# Patient Record
Sex: MALE | Race: WHITE | NOT HISPANIC OR LATINO | Employment: OTHER | URBAN - METROPOLITAN AREA
[De-identification: names, ages, dates, MRNs, and addresses within clinical notes are randomized per-mention and may not be internally consistent; named-entity substitution may affect disease eponyms.]

---

## 2018-02-20 ENCOUNTER — OFFICE VISIT (OUTPATIENT)
Dept: GASTROENTEROLOGY | Facility: CLINIC | Age: 53
End: 2018-02-20
Payer: MEDICARE

## 2018-02-20 VITALS
HEIGHT: 67 IN | HEART RATE: 66 BPM | SYSTOLIC BLOOD PRESSURE: 120 MMHG | BODY MASS INDEX: 27.12 KG/M2 | DIASTOLIC BLOOD PRESSURE: 74 MMHG | WEIGHT: 172.81 LBS

## 2018-02-20 DIAGNOSIS — Z80.0 FAMILY HISTORY OF COLON CANCER: ICD-10-CM

## 2018-02-20 DIAGNOSIS — K59.00 CONSTIPATION, UNSPECIFIED CONSTIPATION TYPE: ICD-10-CM

## 2018-02-20 DIAGNOSIS — K42.9 UMBILICAL HERNIA WITHOUT OBSTRUCTION AND WITHOUT GANGRENE: ICD-10-CM

## 2018-02-20 DIAGNOSIS — R13.19 INTERMITTENT DYSPHAGIA: ICD-10-CM

## 2018-02-20 DIAGNOSIS — R11.2 NON-INTRACTABLE VOMITING WITH NAUSEA, UNSPECIFIED VOMITING TYPE: ICD-10-CM

## 2018-02-20 DIAGNOSIS — R10.13 EPIGASTRIC PAIN: Primary | ICD-10-CM

## 2018-02-20 PROCEDURE — 99204 OFFICE O/P NEW MOD 45 MIN: CPT | Mod: PBBFAC,PO | Performed by: NURSE PRACTITIONER

## 2018-02-20 PROCEDURE — 99999 PR PBB SHADOW E&M-NEW PATIENT-LVL IV: CPT | Mod: PBBFAC,,, | Performed by: NURSE PRACTITIONER

## 2018-02-20 PROCEDURE — 99203 OFFICE O/P NEW LOW 30 MIN: CPT | Mod: S$PBB,,, | Performed by: NURSE PRACTITIONER

## 2018-02-20 RX ORDER — CELECOXIB 200 MG/1
200 CAPSULE ORAL 2 TIMES DAILY
Refills: 5 | COMMUNITY
Start: 2017-11-18 | End: 2019-04-23

## 2018-02-20 RX ORDER — VERAPAMIL HYDROCHLORIDE 120 MG/1
120 TABLET, FILM COATED, EXTENDED RELEASE ORAL NIGHTLY
Refills: 2 | COMMUNITY
Start: 2017-11-25

## 2018-02-20 RX ORDER — MULTIVITAMIN
1 TABLET ORAL DAILY
COMMUNITY

## 2018-02-20 RX ORDER — HYDROCODONE BITARTRATE AND ACETAMINOPHEN 5; 325 MG/1; MG/1
1 TABLET ORAL EVERY 12 HOURS PRN
COMMUNITY
End: 2024-03-03

## 2018-02-20 RX ORDER — POLYETHYLENE GLYCOL 3350 17 G/17G
17 POWDER, FOR SOLUTION ORAL DAILY
Qty: 510 G | Refills: 2 | Status: SHIPPED | OUTPATIENT
Start: 2018-02-20 | End: 2018-03-22

## 2018-02-20 RX ORDER — ONDANSETRON HYDROCHLORIDE 8 MG/1
8 TABLET, FILM COATED ORAL EVERY 6 HOURS
Refills: 12 | COMMUNITY
Start: 2018-02-03

## 2018-02-20 NOTE — PATIENT INSTRUCTIONS
Epigastric Pain (Uncertain Cause)     Epigastric pain can be a sign of disease in the upper abdomen. Common causes include:  · Acid reflux (stomach acid flowing up into the esophagus)  · Gastritis (irritation of the stomach lining)  · Peptic Ulcer Disease  · Inflammation of the pancreas  · Gallstone  · Infection in the gallbladder  Pain may be dull or burning. It may spread upward to the chest or to the back. There may be other symptoms such as belching, bloating, cramps or hunger pains. There may be weight loss or poor appetite, nausea or vomiting.  Since the diagnosis of your pain is not certain yet, further tests may sometimes be needed. Sometimes the doctor will treat you for the most likely condition to see if there is improvement before doing further tests.  Home care  Medicines  · Antacids help neutralize the normal acids in your stomach. Examples are Maalox, Mylanta, Rolaids, and Tums. If you dont like the liquid, you can also try a chewable one. You may find one works better than another for you. Overuse can cause diarrhea or constipation.  · Acid blockers (H2 blockers) decrease acid production. Examples are cimetidine (Tagamet), famotidine (Pepcid) and ranitidine (Zantac).  · Acid inhibitors (PPIs) decrease acid production in a different way than the blockers. You may find they work better, but can take a little longer to take effect.  Examples are omeprazole (Prilosec), lansoprazole (Prevacid), pantoprazole (Protonix), rabeprazole (Aciphex), and esomeprazole (Nexium).  · Take an antacid 30-60 minutes after eating and at bedtime, but not at the same time as an acid blocker.  · Try not to take NSAIDs. Aspirin may also cause problems, but if taking it for your heart or other medical reasons, talk to your doctor before stopping it; you do not want to cause a worse problem, like a heart attack or stroke.  Diet  · If certain foods seem to cause your spasm, try to avoid them.   · Eat slowly and chew food well  before swallowing. Symptoms of gastritis can be worsened by certain foods. Limit or avoid fatty, fried, and spicy foods, as well as coffee, chocolate, mint, and foods with high acid content such as tomatoes and citrus fruit and juices (orange, grapefruit, lemon).  · Avoid alcohol, caffeine, and tobacco, which can delay healing and worsen your problem.  · Try eating smaller meals with snacks in between  Follow-up care  Follow up with your healthcare provider or as advised.  When to seek medical advice  Call your healthcare provider right away if any of the following occur:  · Stomach pain worsens or moves to the right lower part of the abdomen  · Chest pain appears, or if it worsens or spreads to the chest, back, neck, shoulder, or arm  · Frequent vomiting (cant keep down liquids)  · Blood in the stool or vomit (red or black color)  · Feeling weak or dizzy, fainting, or having trouble breathing  · Fever of 100.4ºF (38ºC) or higher, or as directed by your healthcare provider  · Abdominal swelling  Date Last Reviewed: 9/25/2015  © 2471-1917 [employer]. [address] [place] [id number]. All rights reserved. This information is not intended as a substitute for professional medical care. Always follow your healthcare professional's instructions.

## 2018-02-20 NOTE — PROGRESS NOTES
Subjective:       Patient ID: Srinath Mckay Jr. is a 53 y.o. male Body mass index is 27.07 kg/m².    Chief Complaint: Abdominal Pain (epigastric)    This patient is new to me.    Abdominal Pain   This is a new problem. The current episode started more than 1 month ago (started around 1/2018). The problem occurs daily (usually around noon). Duration: lasts a few minutes. The problem has been unchanged. The pain is located in the epigastric region and periumbilical region. The pain is at a severity of 0/10 (currently). The quality of the pain is burning. The abdominal pain radiates to the RUQ and LUQ (rarely). Associated symptoms include belching (frequent with nausea), constipation (bowel movements maybe twice a week; exlax PRN- mild relief), flatus (not more than usual), headaches (history of migraines, denies currently; seeing neurology), nausea (relates to when he has migraines) and vomiting (relates to when he has migraines; occasional emesis of food & bile; denies bright red blood or coffee ground emesis). Pertinent negatives include no diarrhea, dysuria, fever, frequency, hematochezia, melena or weight loss. The pain is aggravated by NSAIDs (ibuprofen in the past- he has stopped it). Relieved by: lying flat. He has tried proton pump inhibitors (prilosec 20 mg once daily, tums prn; zofran prn, norco PRN- a few times a week, celebrex BID) for the symptoms. His past medical history is significant for GERD (prilosec 20 mg once daily). There is no history of Crohn's disease, gallstones, pancreatitis, PUD or ulcerative colitis.     Review of Systems   Constitutional: Positive for appetite change (decreased at times). Negative for chills, fatigue, fever, unexpected weight change and weight loss.   HENT: Positive for trouble swallowing (rarely can occur with anything even saliva; in the past he had his esophagus stretched, which helped). Negative for sore throat.    Respiratory: Negative for cough, choking and  shortness of breath.    Cardiovascular: Negative for chest pain.   Gastrointestinal: Positive for abdominal pain, constipation (bowel movements maybe twice a week; exlax PRN- mild relief), flatus (not more than usual), nausea (relates to when he has migraines) and vomiting (relates to when he has migraines; occasional emesis of food & bile; denies bright red blood or coffee ground emesis). Negative for anal bleeding, blood in stool, diarrhea, hematochezia, melena and rectal pain.   Genitourinary: Negative for difficulty urinating, dysuria, flank pain and frequency.   Neurological: Positive for headaches (history of migraines, denies currently; seeing neurology). Negative for weakness.       Past Medical History:   Diagnosis Date    Migraine      Past Surgical History:   Procedure Laterality Date    COLONOSCOPY  ~2014    normal findings per patient report; unsure of provider    FRACTURE SURGERY      knee    UPPER GASTROINTESTINAL ENDOSCOPY  ~2016    Dr. Rocha, normal findings per patient report    UPPER GASTROINTESTINAL ENDOSCOPY  ~2017    Dr. Rocha, normal findings per patient report     Family History   Problem Relation Age of Onset    Hypertension Mother     Colon cancer Father 68    Crohn's disease Neg Hx     Esophageal cancer Neg Hx     Ulcerative colitis Neg Hx     Stomach cancer Neg Hx      Wt Readings from Last 10 Encounters:   02/20/18 78.4 kg (172 lb 13.5 oz)   08/28/12 72.6 kg (160 lb)     Lab Results   Component Value Date    WBC 6.72 01/29/2008    HGB 15.2 01/29/2008    HCT 43.9 01/29/2008    MCV 88.9 01/29/2008     01/29/2008     CMP  Sodium   Date Value Ref Range Status   01/29/2008 139 136 - 145 mMol/l Final     Potassium   Date Value Ref Range Status   01/29/2008 3.9 3.3 - 5.3 mMol/l Final     Chloride   Date Value Ref Range Status   01/29/2008 102 95 - 110 mMol/l Final     CO2   Date Value Ref Range Status   01/29/2008 27 23.0 - 29.0 mEq/L Final     Glucose   Date Value Ref  Range Status   01/29/2008 100 70 - 110 mg/dl Final     BUN, Bld   Date Value Ref Range Status   01/29/2008 16 5 - 23 mg/dl Final     Creatinine   Date Value Ref Range Status   01/29/2008 1.1 0.5 - 1.4 mg/dl Final     Calcium   Date Value Ref Range Status   01/29/2008 9.6 8.7 - 10.5 mg/dl Final     Total Protein   Date Value Ref Range Status   01/29/2008 7.0 6.0 - 8.4 gm/dl Final     Albumin   Date Value Ref Range Status   01/29/2008 4.7 3.5 - 5.2 g/dl Final     Total Bilirubin   Date Value Ref Range Status   01/29/2008 0.6 0.1 - 1.0 mg/dl Final     Comment:     These are the guidelines recommended by the .  Especially  for infants and newborns, interpretation of results should be based  on gestational age, weight and in agreement with clinical  observations.  .  Premature Infant recommended reference ranges:  Up to 24 hours.............<8.0 mg/dl  Up to 48 hours............<12.0 mg/dl  3-5 days..................<15.0 mg/dl  6-29 days.................<15.0 mg/dl       Alkaline Phosphatase   Date Value Ref Range Status   01/29/2008 57 55 - 135 U/L Final     AST   Date Value Ref Range Status   01/29/2008 13 0 - 37 U/L Final     ALT   Date Value Ref Range Status   01/29/2008 11 0 - 40 U/L Final     Lab Results   Component Value Date    TSH 0.58 03/31/2004     Objective:      Physical Exam   Constitutional: He is oriented to person, place, and time. He appears well-developed and well-nourished. No distress.   HENT:   Mouth/Throat: Oropharynx is clear and moist and mucous membranes are normal. No oral lesions. No oropharyngeal exudate.   Eyes: Conjunctivae are normal. Pupils are equal, round, and reactive to light. No scleral icterus.   Cardiovascular: Normal rate.    Pulmonary/Chest: Effort normal and breath sounds normal. No respiratory distress. He has no wheezes.   Abdominal: Soft. Normal appearance and bowel sounds are normal. He exhibits no distension, no abdominal bruit and no mass. There is no  hepatosplenomegaly. There is no tenderness. There is no rigidity, no rebound, no guarding, no tenderness at McBurney's point and negative Shelley's sign. A hernia (umbilical: nontender, fully reducible) is present.   Neurological: He is alert and oriented to person, place, and time.   Skin: Skin is warm and dry. No rash noted. He is not diaphoretic. No erythema. No pallor.   Non-jaundiced   Psychiatric: He has a normal mood and affect. His behavior is normal. Judgment and thought content normal.   Nursing note and vitals reviewed.      Assessment:       1. Epigastric pain    2. Constipation, unspecified constipation type    3. Family history of colon cancer    4. Intermittent dysphagia    5. Non-intractable vomiting with nausea, unspecified vomiting type    6. Umbilical hernia without obstruction and without gangrene        Plan:     Patient agreed to sign medical records release consent for us to obtain records from Dr. Rocha (to include endoscopy reports)    Epigastric pain  -     US Abdomen Complete; Future; Expected date: 02/20/2018  -     CBC auto differential; Future; Expected date: 02/20/2018  -     Comprehensive metabolic panel; Future; Expected date: 02/20/2018  -     Amylase; Future; Expected date: 02/20/2018  -     Lipase; Future; Expected date: 02/20/2018  - CONTINUE PRILOSEC 20 MG ONCE DAILY AS DIRECTED, take in the morning 30-60 minutes before breakfast, discussed about possible long term use of medication (prefer to use lowest effective dose or discontinuing if possible), pt verbalized understanding  -Educated patient on lifestyle modifications to help control/reduce reflux/abdominal pain including: avoid large meals, avoid eating within 2-3 hours of bedtime (avoid late night eating & lying down soon after eating), elevate head of bed if nocturnal symptoms are present, smoking cessation (if current smoker), & weight loss (if overweight).   -Educated to avoid known foods which trigger reflux symptoms  & to minimize/avoid high-fat foods, chocolate, caffeine, citrus, alcohol, & tomato products.  -Advised to avoid/limit use of NSAID's, since they can cause GI upset, bleeding, and/or ulcers. If needed, take with food.   - discussed about possible EGD pending on review of previous medical records, patient verbalized understanding & agreed with management plan    Constipation, unspecified constipation type  -     TSH; Future; Expected date: 02/20/2018  -  START   polyethylene glycol (GLYCOLAX) 17 gram/dose powder; Take 17 g by mouth once daily.  Dispense: 510 g; Refill: 2  -     Case request GI: COLONOSCOPY, - schedule Colonoscopy, discussed procedure with the patient, patient verbalized understanding  Recommended daily exercise as tolerated, adequate water intake (six 8-oz glasses of water daily), and high fiber diet. OTC fiber supplements are recommended if diet does not reach daily fiber goal (25 grams daily), such as Metamucil, Citrucel, or FiberCon (take as directed, separate from other oral medications by >2 hours).  -Recommend taking an OTC stool softener such as Colace as directed to avoid hard stools and straining with bowel movements PRN  - If no improvement with above recommendations, try intermittently dosed Dulcolax OTC as directed (every 3-4  days) PRN to facilitate bowel movements  -If still no improvement with these measures, call/follow-up  - discussed with patient that a side effect of narcotic pain medications is constipation, advised patient to talk to provider who manages pain medication and to try to stop or decrease use of narcotics, patient verbalized understanding    Family history of colon cancer  -     Case request GI: COLONOSCOPY, - schedule Colonoscopy, discussed procedure with the patient, patient verbalized understanding    Intermittent dysphagia  - discussed about possible EGD pending on review of previous medical records, patient verbalized understanding & agreed with management plan  -  educated patient to eat smaller more frequent meals and to eat slowly and advised to eat a soft diet.  - possible UGI/esophagram/esophageal manometry if symptoms persist    Non-intractable vomiting with nausea, unspecified vomiting type  -     US Abdomen Complete; Future; Expected date: 02/20/2018  -     CBC auto differential; Future; Expected date: 02/20/2018  -     Comprehensive metabolic panel; Future; Expected date: 02/20/2018  -     Amylase; Future; Expected date: 02/20/2018  -     Lipase; Future; Expected date: 02/20/2018  - discussed about possible EGD pending on review of previous medical records, patient verbalized understanding & agreed with management plan  - continue zofran prn as directed    Umbilical hernia without obstruction and without gangrene  - discussed with patient about diagnosis, and informed patient that if it becomes painful or if it does not reduce patient needs to see a general surgeon or go to the ER, patient verbalized understanding    Follow-up in about 1 month (around 3/20/2018), or if symptoms worsen or fail to improve.      If no improvement in symptoms or symptoms worsen, call/follow-up at clinic or go to ER.

## 2018-02-26 ENCOUNTER — HOSPITAL ENCOUNTER (OUTPATIENT)
Dept: RADIOLOGY | Facility: HOSPITAL | Age: 53
Discharge: HOME | End: 2018-02-26
Attending: NURSE PRACTITIONER
Payer: MEDICARE

## 2018-02-26 DIAGNOSIS — R10.13 EPIGASTRIC PAIN: ICD-10-CM

## 2018-02-26 DIAGNOSIS — R11.2 NON-INTRACTABLE VOMITING WITH NAUSEA, UNSPECIFIED VOMITING TYPE: ICD-10-CM

## 2018-02-26 PROCEDURE — 76700 US EXAM ABDOM COMPLETE: CPT | Mod: TC

## 2018-02-26 PROCEDURE — 76700 US EXAM ABDOM COMPLETE: CPT | Mod: 26,,, | Performed by: RADIOLOGY

## 2018-02-27 ENCOUNTER — TELEPHONE (OUTPATIENT)
Dept: GASTROENTEROLOGY | Facility: CLINIC | Age: 53
End: 2018-02-27

## 2018-02-27 DIAGNOSIS — K59.00 CONSTIPATION, UNSPECIFIED CONSTIPATION TYPE: ICD-10-CM

## 2018-02-27 DIAGNOSIS — R11.2 NON-INTRACTABLE VOMITING WITH NAUSEA, UNSPECIFIED VOMITING TYPE: ICD-10-CM

## 2018-02-27 DIAGNOSIS — K42.9 UMBILICAL HERNIA WITHOUT OBSTRUCTION AND WITHOUT GANGRENE: ICD-10-CM

## 2018-02-27 DIAGNOSIS — N28.89 MASS OF RIGHT KIDNEY: Primary | ICD-10-CM

## 2018-02-27 DIAGNOSIS — R10.13 EPIGASTRIC PAIN: ICD-10-CM

## 2018-02-27 NOTE — TELEPHONE ENCOUNTER
Pt scheduled for ct scan, did not want to schedule colonoscopy just yet. States has a lot of things going on, will call back to schedule colon.

## 2018-02-27 NOTE — TELEPHONE ENCOUNTER
I called patient and reviewed the below results and recommendations:  - radiology report of the abdominal ultrasound showed unremarkable findings of the GI organs.   The right kidney was noted with multiple cystic lesions. We need to further evaluate this finding and rule out mass with further imaging and referral to urology. Placed order for ct scan and referral to urology. Otherwise unremarkable finding of the ultrasound.  Blood work showed normal thyroid level; unremarkable electrolytes, kidney and liver function levels; normal pancreatic enzymes, and stable blood counts (no anemia). Continue with previous recommendations. If no improvement in symptoms or symptoms worsen, call/follow-up at clinic or go to ER.  Patient reports history of kidney lesion greater than 10 years ago and told it was ok and was the size of a dime. Patient reports he also needs to reschedule colonoscopy.  I addressed all patient's questions and concerns. Patient verbalized understanding and agreed with management plan.    MIMI

## 2018-03-06 ENCOUNTER — HOSPITAL ENCOUNTER (OUTPATIENT)
Dept: RADIOLOGY | Facility: HOSPITAL | Age: 53
Discharge: HOME | End: 2018-03-06
Attending: NURSE PRACTITIONER
Payer: MEDICARE

## 2018-03-06 ENCOUNTER — TELEPHONE (OUTPATIENT)
Dept: GASTROENTEROLOGY | Facility: CLINIC | Age: 53
End: 2018-03-06

## 2018-03-06 DIAGNOSIS — N28.89 MASS OF RIGHT KIDNEY: ICD-10-CM

## 2018-03-06 DIAGNOSIS — K42.9 UMBILICAL HERNIA WITHOUT OBSTRUCTION AND WITHOUT GANGRENE: ICD-10-CM

## 2018-03-06 DIAGNOSIS — R11.2 NON-INTRACTABLE VOMITING WITH NAUSEA, UNSPECIFIED VOMITING TYPE: ICD-10-CM

## 2018-03-06 DIAGNOSIS — K59.00 CONSTIPATION, UNSPECIFIED CONSTIPATION TYPE: ICD-10-CM

## 2018-03-06 DIAGNOSIS — R10.13 EPIGASTRIC PAIN: ICD-10-CM

## 2018-03-06 PROCEDURE — 25500020 PHARM REV CODE 255: Performed by: NURSE PRACTITIONER

## 2018-03-06 PROCEDURE — 74178 CT ABD&PLV WO CNTR FLWD CNTR: CPT | Mod: 26,,, | Performed by: RADIOLOGY

## 2018-03-06 PROCEDURE — 74178 CT ABD&PLV WO CNTR FLWD CNTR: CPT | Mod: TC

## 2018-03-06 RX ADMIN — IOHEXOL 75 ML: 350 INJECTION, SOLUTION INTRAVENOUS at 06:03

## 2018-03-06 NOTE — TELEPHONE ENCOUNTER
Please call to inform & review the results with the patient- radiology report of the CT scan showed liver hemangioma (collection of blood vessels that are typically benign and asymptomatic).  Spleen mildly enlarged: Recommend follow-up with Primary Care Provider for continued evaluation and management of this finding.  Right kidney noted with 2 cysts and stone; also with a large cystic mass. Patient needs to see urology for continued evaluation and management of this finding (referral was ordered on 2/27/18).  Moderate amount of stool in the colon. Recommend high fiber diet (20-30 grams of fiber daily)/OTC fiber supplements daily as directed and continue constipation recommendations.  Otherwise unremarkable findings.    Continue with previous recommendations. If no improvement in symptoms or symptoms worsen, call/follow-up at clinic or go to ER.  Please release results to patient's mychart once you have discussed results and recommendations with patient.  Thanks,  Ada SAUCEDO

## 2018-03-06 NOTE — TELEPHONE ENCOUNTER
Pt notified ct result, instr to call urology ASAP for 1st available appt, # given for Dr. Tang, pt states will call today for appt

## 2018-03-09 ENCOUNTER — TELEPHONE (OUTPATIENT)
Dept: GASTROENTEROLOGY | Facility: CLINIC | Age: 53
End: 2018-03-09

## 2018-03-09 NOTE — TELEPHONE ENCOUNTER
Pt's wife states he has appt with urology next Wednesday. I explained that his PCP can follow the spleen.

## 2018-03-09 NOTE — TELEPHONE ENCOUNTER
----- Message from Nedra Trevizo sent at 3/8/2018  4:02 PM CST -----  Contact: Pt's wife Ashley  Pt's wife Ashley Is calling in regards to pt's results. Please give pt's wife a call back at [phone]. Pt can be reached at [phone] (home)

## 2018-04-18 ENCOUNTER — TELEPHONE (OUTPATIENT)
Dept: GASTROENTEROLOGY | Facility: CLINIC | Age: 53
End: 2018-04-18

## 2018-04-18 NOTE — TELEPHONE ENCOUNTER
"----- Message from Kvng Reza LPN sent at 4/18/2018 10:46 AM CDT -----  Regarding: Cancellation of Order # [id number]  Order number [id number] for the procedure CASE REQUEST GI [GI5] has  been canceled by Kvng Reza LPN [[id number]]. This procedure was   ordered by HOUSTON Santillan [[id number]] on Feb 20, 2018 for   the patient Srinath ARCINIEGA Nely Hdez [[id number]]. The reason for   cancellation was "None".  "  Canceled None Kvng Reza LPN 4/18/18 1046   The associated case was canceled: Patient Refused (Comment Required)      "    "

## 2018-05-03 ENCOUNTER — TELEPHONE (OUTPATIENT)
Dept: FAMILY MEDICINE | Facility: CLINIC | Age: 53
End: 2018-05-03

## 2018-05-03 DIAGNOSIS — I10 ESSENTIAL HYPERTENSION: ICD-10-CM

## 2018-05-03 DIAGNOSIS — Z12.5 SCREENING FOR PROSTATE CANCER: ICD-10-CM

## 2018-05-03 NOTE — TELEPHONE ENCOUNTER
----- Message from Merlyn Carney sent at 5/3/2018 11:01 AM CDT -----  Regarding: LAB ORDERS  Contact: [phone]  PLEASE SEND PT LAB ORDERS TO LAB [facility]/ PT ZOFIA 05/17

## 2018-06-27 ENCOUNTER — OFFICE VISIT (OUTPATIENT)
Dept: FAMILY MEDICINE | Facility: CLINIC | Age: 53
End: 2018-06-27
Payer: MEDICARE

## 2018-06-27 VITALS
SYSTOLIC BLOOD PRESSURE: 110 MMHG | HEIGHT: 67 IN | DIASTOLIC BLOOD PRESSURE: 66 MMHG | HEART RATE: 74 BPM | BODY MASS INDEX: 25.43 KG/M2 | WEIGHT: 162 LBS | OXYGEN SATURATION: 98 %

## 2018-06-27 DIAGNOSIS — R07.89 ATYPICAL CHEST PAIN: ICD-10-CM

## 2018-06-27 DIAGNOSIS — R12 HEARTBURN: ICD-10-CM

## 2018-06-27 DIAGNOSIS — H93.13 TINNITUS OF BOTH EARS: Primary | ICD-10-CM

## 2018-06-27 PROBLEM — B96.81 GASTRITIS DUE TO HELICOBACTER SPECIES: Status: ACTIVE | Noted: 2018-06-27

## 2018-06-27 PROBLEM — K29.70 GASTRITIS DUE TO HELICOBACTER SPECIES: Status: ACTIVE | Noted: 2018-06-27

## 2018-06-27 PROBLEM — R13.10 DYSPHAGIA: Status: ACTIVE | Noted: 2018-06-27

## 2018-06-27 PROBLEM — R51.9 HEADACHE: Status: ACTIVE | Noted: 2018-06-27

## 2018-06-27 PROBLEM — R16.0 LIVER MASS: Status: ACTIVE | Noted: 2018-06-27

## 2018-06-27 PROCEDURE — 99214 OFFICE O/P EST MOD 30 MIN: CPT | Mod: ,,, | Performed by: NURSE PRACTITIONER

## 2018-06-27 RX ORDER — MOMETASONE FUROATE 50 UG/1
2 SPRAY, METERED NASAL DAILY
Qty: 1 EACH | Refills: 3 | Status: SHIPPED | OUTPATIENT
Start: 2018-06-27 | End: 2024-03-11

## 2018-06-27 NOTE — PATIENT INSTRUCTIONS
"  GERD (Adult)    The esophagus is a tube that carries food from the mouth to the stomach. A valve at the lower end of the esophagus prevents stomach acid from flowing upward. When this valve doesn't work properly, stomach contents may repeatedly flow back up (reflux) into the esophagus. This is called gastroesophageal reflux disease (GERD). GERD can irritate the esophagus. It can cause problems with swallowing or breathing. In severe cases, GERD can cause recurrent pneumonia or other serious problems.  Symptoms of reflux include burning, pressure or sharp pain in the upper abdomen or mid to lower chest. The pain can spread to the neck, back, or shoulder. There may be belching, an acid taste in the back of the throat, chronic cough, or sore throat or hoarseness. GERD symptoms often occur during the day after a big meal. They can also occur at night when lying down.   Home care  Lifestyle changes can help reduce symptoms. If needed, medicines may be prescribed. Symptoms often improve with treatment, but if treatment is stopped, the symptoms often return after a few months. So most persons with GERD will need to continue treatment.  Lifestyle changes  · Limit or avoid fatty, fried, and spicy foods, as well as coffee, chocolate, mint, and foods with high acid content such as tomatoes and citrus fruit and juices (orange, grapefruit, lemon).  · Dont eat large meals, especially at night. Frequent, smaller meals are best. Do not lie down right after eating. And dont eat anything 3 hours before going to bed.  · Avoid drinking alcohol and smoking. As much as possible, stay away from second hand smoke.  · If you are overweight, losing weight will reduce symptoms.   · Avoid wearing tight clothing around your stomach area.  · If your symptoms occur during sleep, use a foam wedge to elevate your upper body (not just your head.) Or, place 4" blocks under the head of your bed.  Medicines  If needed, medicines can help relieve " the symptoms of GERD and prevent damage to the esophagus. Discuss a medicine plan with your healthcare provider. This may include one or more of the following medicines:  · Antacids to help neutralize the normal acids in your stomach.  · Acid blockers (H2 blockers) to decrease acid production.  · Acid inhibitors (PPIs) to decrease acid production in a different way than the blockers. They may work better, but can take a little longer to take effect.  Take an antacid 30-60 minutes after eating and at bedtime, but not at the same time as an acid blocker.  Try not to take medicines such as ibuprofen and aspirin. If you are taking aspirin for your heart or other medical reasons, talk to your healthcare provider about stopping it.  Follow-up care  Follow up with your healthcare provider or as advised by our staff.  When to seek medical advice  Call your healthcare provider if any of the following occur:  · Stomach pain gets worse or moves to the lower right abdomen (appendix area)  · Chest pain appears or gets worse, or spreads to the back, neck, shoulder, or arm  · Frequent vomiting (cant keep down liquids)  · Blood in the stool or vomit (red or black in color)  · Feeling weak or dizzy  · Fever of 100.4ºF (38ºC) or higher, or as directed by your healthcare provider  Date Last Reviewed: 6/23/2015 © 2000-2017 [employer]. [address] [place] [address]. All rights reserved. This information is not intended as a substitute for professional medical care. Always follow your healthcare professional's instructions.        Tinnitus (Ringing in the Ears)     Treatment may include maskers and hearing aids.     Tinnitus is the term for a noise in your ear not caused by an outside sound. The noise might be a ringing, clicking, hiss, or roar. It can vary in pitch and may be soft or quite loud. For some people, tinnitus is a minor nuisance. But for others, the noise can make it hard to hear, work, and even  sleep. When tinnitus can't be cured, a number of treatments may offer relief.  What causes tinnitus?  Loud noises, hearing loss, and ear wax can cause tinnitus. So can certain medicines. Large amounts of aspirin or caffeine are sometimes to blame. In many cases, the exact cause of tinnitus is unknown.  How is tinnitus treated?  Identifying and removing the cause is the best way to treat tinnitus. For that reason, your healthcare provider may refer you to an otolaryngologist (ear, nose, and throat doctor). Your hearing may also be checked by an audiologist (hearing specialist). If you have hearing loss, wearing a hearing aid may help your tinnitus. When the cause can't be found, the tinnitus itself may be treated. Some of the treatments are listed below, and your healthcare provider can tell you more about them:  · Maskers are small devices that look like hearing aids. They emit a pleasant sound that helps cover up the ringing in your ears. Hearing aids and maskers are sometimes used together.  · Medicines that treat anxiety and depression may ease tinnitus in some people.  · Hypnosis or relaxation therapy may help head noise seem less severe.  · Tinnitus retraining therapy combines counseling and maskers. Both can help take your mind off the tinnitus.  For more information  · American Speech-Hearing-Language Association 907-593-3215 www.naomi.org  · American Tinnitus Association 299-421-8382 www.elisha.org  · National [place] on Deafness and other Communication Disorders 335-315-8177 www.nidcd.nih.gov   Date Last Reviewed: 7/1/2016 © 2000-2017 [employer]. [address] [place] [address], [place] [address]. All rights reserved. This information is not intended as a substitute for professional medical care. Always follow your healthcare professional's instructions.

## 2018-06-27 NOTE — PROGRESS NOTES
"    SUBJECTIVE:      Patient ID: Srinath Mckay Jr. is a 53 y.o. male.    Chief Complaint: Chest Pain and Tinnitus (x 1 year like a cricket)    Srinath is here today with c/o "a cricket sound in my ears" for over a year.  He also has c/o intermittent chest pain that has been going on for several months.  He states it happens a couple of times a week with burning in the middle of his chest.  It goes away without treatment. It is not associated with any activity but does seem to be worse with fatty or spicy foods.      Chest Pain    This is a new problem. The current episode started more than 1 month ago. The onset quality is undetermined. The problem occurs intermittently. The problem has been unchanged. The pain is present in the substernal region. The pain is mild. The quality of the pain is described as burning. The pain does not radiate. Associated symptoms include headaches. Pertinent negatives include no abdominal pain, back pain, claudication, cough, diaphoresis, dizziness, exertional chest pressure, fever, hemoptysis, irregular heartbeat, leg pain, lower extremity edema, malaise/fatigue, nausea, near-syncope, numbness, orthopnea, palpitations, PND, shortness of breath, sputum production, syncope, vomiting or weakness. Associated with: certain foods. He has tried antacids for the symptoms. The treatment provided significant relief.   Ringing in Ears:   Chronicity:  Chronic  Onset:  More than 1 year ago  Progression since onset:  Unchanged  Frequency:  Constantly  Severity:  Severe  Duration:  Off/on all day  Ringing in ear characteristics:  Moderate   Associated symptoms: headaches, tinnitus and masked by noise.  No dizziness, no vertigo, no ear congestion, no ear pain, no fever, no buzzing, no rhinorrhea, no aural fullness, no fluctuance, no otalgia, no otorrhea, no facial weakness and no visual disturbances.  Aggravated by:  Lying downNo dizziness.      Past Surgical History:   Procedure Laterality Date "    COLONOSCOPY  ~2014    normal findings per patient report; unsure of provider    FRACTURE SURGERY      knee    UPPER GASTROINTESTINAL ENDOSCOPY  ~2016    Dr. Rocha, normal findings per patient report    UPPER GASTROINTESTINAL ENDOSCOPY  ~2017    Dr. Rocha, normal findings per patient report     Family History   Problem Relation Age of Onset    Hypertension Mother     Colon cancer Father 68    Crohn's disease Neg Hx     Esophageal cancer Neg Hx     Ulcerative colitis Neg Hx     Stomach cancer Neg Hx       Social History     Social History    Marital status:      Spouse name: N/A    Number of children: N/A    Years of education: N/A     Occupational History    disabled       Social History Main Topics    Smoking status: Never Smoker    Smokeless tobacco: Never Used    Alcohol use No    Drug use: No    Sexual activity: Not Asked     Other Topics Concern    None     Social History Narrative    None     Current Outpatient Prescriptions   Medication Sig Dispense Refill    celecoxib (CELEBREX) 200 MG capsule Take 200 mg by mouth 2 (two) times daily.  5    hydrocodone-acetaminophen 5-325mg (NORCO) 5-325 mg per tablet Take 1 tablet by mouth every 12 (twelve) hours as needed for Pain.      multivitamin (THERAGRAN) per tablet Take 1 tablet by mouth once daily.      ondansetron (ZOFRAN) 8 MG tablet Take 8 mg by mouth every 6 (six) hours.  12    verapamil (CALAN-SR) 120 MG CR tablet Take 120 mg by mouth every evening.  2    mometasone (NASONEX) 50 mcg/actuation nasal spray 2 sprays by Nasal route once daily. 1 each 3    ranitidine (ZANTAC) 150 MG tablet Take 1 tablet (150 mg total) by mouth 2 (two) times daily as needed for Heartburn. 60 tablet 3     No current facility-administered medications for this visit.      Review of patient's allergies indicates:  No Known Allergies   Past Medical History:   Diagnosis Date    Migraine      Past Surgical History:   Procedure Laterality Date     "COLONOSCOPY  ~2014    normal findings per patient report; unsure of provider    FRACTURE SURGERY      knee    UPPER GASTROINTESTINAL ENDOSCOPY  ~2016    Dr. Rocha, normal findings per patient report    UPPER GASTROINTESTINAL ENDOSCOPY  ~2017    Dr. Rocha, normal findings per patient report       Review of Systems   Constitutional: Negative for activity change, appetite change, chills, diaphoresis, fatigue, fever, malaise/fatigue and unexpected weight change.   HENT: Positive for tinnitus. Negative for congestion, ear pain, facial swelling, hearing loss, rhinorrhea, sinus pain, sinus pressure, sneezing, sore throat and voice change.    Eyes: Negative for pain, discharge and visual disturbance.   Respiratory: Negative for apnea, cough, hemoptysis, sputum production, shortness of breath and wheezing.    Cardiovascular: Positive for chest pain. Negative for palpitations, orthopnea, claudication, leg swelling, syncope, PND and near-syncope.   Gastrointestinal: Negative for abdominal pain, constipation, diarrhea, nausea and vomiting.   Endocrine: Negative for polydipsia, polyphagia and polyuria.   Genitourinary: Negative for dysuria, frequency and urgency.   Musculoskeletal: Negative for arthralgias, back pain, gait problem and myalgias.   Skin: Negative for color change, pallor and rash.   Allergic/Immunologic: Negative for immunocompromised state.   Neurological: Positive for headaches. Negative for dizziness, vertigo, syncope, weakness and numbness.   Hematological: Negative for adenopathy.   Psychiatric/Behavioral: Negative for confusion, self-injury and suicidal ideas.      OBJECTIVE:      Vitals:    06/27/18 1051   BP: 110/66   Pulse: 74   SpO2: 98%   Weight: 73.5 kg (162 lb)   Height: 5' 7" (1.702 m)     Physical Exam   Constitutional: He is oriented to person, place, and time. He appears well-developed and well-nourished. No distress.   HENT:   Head: Normocephalic and atraumatic.   Right Ear: Tympanic " membrane, external ear and ear canal normal.   Left Ear: Tympanic membrane, external ear and ear canal normal.   Nose: Nose normal. Right sinus exhibits no maxillary sinus tenderness and no frontal sinus tenderness. Left sinus exhibits no maxillary sinus tenderness and no frontal sinus tenderness.   Mouth/Throat: Uvula is midline and oropharynx is clear and moist. No oropharyngeal exudate, posterior oropharyngeal edema or posterior oropharyngeal erythema.   Eyes: Conjunctivae, EOM and lids are normal. Pupils are equal, round, and reactive to light. Right eye exhibits no discharge. Left eye exhibits no discharge. No scleral icterus.   Neck: Normal range of motion. Neck supple. Carotid bruit is not present. No tracheal deviation present. No thyromegaly present.   Cardiovascular: Normal rate, regular rhythm, normal heart sounds and intact distal pulses.  Exam reveals no gallop and no friction rub.    No murmur heard.  Pulmonary/Chest: Effort normal and breath sounds normal. No respiratory distress. He has no wheezes. He has no rales.   Abdominal: Soft. Bowel sounds are normal. He exhibits no distension and no mass. There is no tenderness. There is no guarding.   Musculoskeletal: Normal range of motion.   Lymphadenopathy:     He has no cervical adenopathy.   Neurological: He is alert and oriented to person, place, and time.   Skin: Skin is warm, dry and intact. He is not diaphoretic.   Psychiatric: He has a normal mood and affect. He expresses no suicidal plans.      Assessment:       1. Tinnitus of both ears    2. Atypical chest pain    3. Heartburn        Plan:       Tinnitus of both ears  -     mometasone (NASONEX) 50 mcg/actuation nasal spray; 2 sprays by Nasal route once daily.  Dispense: 1 each; Refill: 3   ENT if no improvement    Atypical chest pain   Likely GI related  -     EKG 12-lead-sinus bradycardia   Follow up if no improvement with zantac   Labs to check cholesterol ordered previously; pt states he will  go soon    Heartburn   GERD diet; zantac prn  -     ranitidine (ZANTAC) 150 MG tablet; Take 1 tablet (150 mg total) by mouth 2 (two) times daily as needed for Heartburn.  Dispense: 60 tablet; Refill: 3      Follow-up if symptoms worsen or fail to improve.      6/27/2018 NORMA Trejo, FNP

## 2018-09-10 ENCOUNTER — OFFICE VISIT (OUTPATIENT)
Dept: FAMILY MEDICINE | Facility: CLINIC | Age: 53
End: 2018-09-10
Payer: MEDICARE

## 2018-09-10 VITALS
SYSTOLIC BLOOD PRESSURE: 110 MMHG | HEIGHT: 67 IN | WEIGHT: 159 LBS | HEART RATE: 77 BPM | DIASTOLIC BLOOD PRESSURE: 72 MMHG | BODY MASS INDEX: 24.96 KG/M2 | OXYGEN SATURATION: 98 %
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
## 2018-09-10 DIAGNOSIS — Z23 NEED FOR DIPHTHERIA, TETANUS, PERTUSSIS, AND HIB VACCINATION: ICD-10-CM

## 2018-09-10 PROCEDURE — 99213 OFFICE O/P EST LOW 20 MIN: CPT | Mod: ,,, | Performed by: NURSE PRACTITIONER

## 2018-09-10 RX ORDER — AMOXICILLIN AND CLAVULANATE POTASSIUM 875; 125 MG/1; MG/1
1 TABLET, FILM COATED ORAL 2 TIMES DAILY
Qty: 20 TABLET | Refills: 0 | Status: SHIPPED | OUTPATIENT
Start: 2018-09-10 | End: 2019-04-23

## 2018-09-10 NOTE — PROGRESS NOTES
SUBJECTIVE:      Patient ID: Srinath Mckay Jr. is a 53 y.o. male.    Chief Complaint: Animal Bite (cat hands)    Patient states he was helping a neighborhood cat get untangled from a rope this morning and the cat bit him twice, once on each hand. Says his wife was insistent on him coming in to be treated. Not having any pain at the site       Animal Bite    The incident occurred today. The incident occurred at home. There is an injury to the left hand and right hand. The patient is experiencing no pain. It is unlikely that a foreign body is present. Pertinent negatives include no chest pain, no visual disturbance, no nausea, no vomiting, no hearing loss, no focal weakness, no light-headedness and no seizures. There have been no prior injuries to these areas. His tetanus status is out of date. He has been behaving normally. There were no sick contacts.       Past Surgical History:   Procedure Laterality Date    COLONOSCOPY  ~2014    normal findings per patient report; unsure of provider    FRACTURE SURGERY      knee    UPPER GASTROINTESTINAL ENDOSCOPY  ~2016    Dr. Rocha, normal findings per patient report    UPPER GASTROINTESTINAL ENDOSCOPY  ~2017    Dr. Rocha, normal findings per patient report     Family History   Problem Relation Age of Onset    Hypertension Mother     Colon cancer Father 68    Crohn's disease Neg Hx     Esophageal cancer Neg Hx     Ulcerative colitis Neg Hx     Stomach cancer Neg Hx       Social History     Socioeconomic History    Marital status:      Spouse name: None    Number of children: None    Years of education: None    Highest education level: None   Social Needs    Financial resource strain: None    Food insecurity - worry: None    Food insecurity - inability: None    Transportation needs - medical: None    Transportation needs - non-medical: None   Occupational History    Occupation: disabled    Tobacco Use    Smoking status: Never Smoker     Smokeless tobacco: Never Used   Substance and Sexual Activity    Alcohol use: No    Drug use: No    Sexual activity: None   Other Topics Concern    None   Social History Narrative    None     Current Outpatient Medications   Medication Sig Dispense Refill    celecoxib (CELEBREX) 200 MG capsule Take 200 mg by mouth 2 (two) times daily.  5    hydrocodone-acetaminophen 5-325mg (NORCO) 5-325 mg per tablet Take 1 tablet by mouth every 12 (twelve) hours as needed for Pain.      multivitamin (THERAGRAN) per tablet Take 1 tablet by mouth once daily.      ondansetron (ZOFRAN) 8 MG tablet Take 8 mg by mouth every 6 (six) hours.  12    ranitidine (ZANTAC) 150 MG tablet Take 1 tablet (150 mg total) by mouth 2 (two) times daily as needed for Heartburn. 60 tablet 3    verapamil (CALAN-SR) 120 MG CR tablet Take 120 mg by mouth every evening.  2    amoxicillin-clavulanate 875-125mg (AUGMENTIN) 875-125 mg per tablet Take 1 tablet by mouth 2 (two) times daily. 20 tablet 0    diphth,pertus,acell,,tetanus (BOOSTRIX) 2.5-8-5 Lf-mcg-Lf/0.5mL Susp Inject 0.5 mLs into the muscle once. for 1 dose 0.5 mL 0    mometasone (NASONEX) 50 mcg/actuation nasal spray 2 sprays by Nasal route once daily. 1 each 3     No current facility-administered medications for this visit.      Review of patient's allergies indicates:  No Known Allergies   Past Medical History:   Diagnosis Date    Migraine      Past Surgical History:   Procedure Laterality Date    COLONOSCOPY  ~2014    normal findings per patient report; unsure of provider    FRACTURE SURGERY      knee    UPPER GASTROINTESTINAL ENDOSCOPY  ~2016    Dr. Rocha, normal findings per patient report    UPPER GASTROINTESTINAL ENDOSCOPY  ~2017    Dr. Rocha, normal findings per patient report       Review of Systems   Constitutional: Negative for appetite change, chills, diaphoresis and unexpected weight change.   HENT: Negative for ear discharge, facial swelling, hearing loss,  "nosebleeds and trouble swallowing.    Eyes: Negative for photophobia, pain and visual disturbance.   Respiratory: Negative for apnea, choking and shortness of breath.    Cardiovascular: Negative for chest pain and palpitations.   Gastrointestinal: Negative for blood in stool, nausea and vomiting.   Endocrine: Negative for polyphagia.   Genitourinary: Negative for difficulty urinating and hematuria.   Musculoskeletal: Negative for gait problem and joint swelling.   Skin: Negative for pallor.   Neurological: Negative for dizziness, focal weakness, seizures, speech difficulty and light-headedness.   Hematological: Does not bruise/bleed easily.   Psychiatric/Behavioral: Negative for agitation and confusion.      OBJECTIVE:      Vitals:    09/10/18 1545   BP: 110/72   Pulse: 77   SpO2: 98%   Weight: 72.1 kg (159 lb)   Height: 5' 7" (1.702 m)     Physical Exam   Constitutional: He is oriented to person, place, and time. He appears well-developed and well-nourished.   HENT:   Head: Atraumatic.   Eyes: Conjunctivae are normal.   Neck: Neck supple.   Cardiovascular: Normal rate, regular rhythm, normal heart sounds and intact distal pulses.   Pulmonary/Chest: Effort normal and breath sounds normal.   Abdominal: Soft. Bowel sounds are normal. He exhibits no distension.   Musculoskeletal: Normal range of motion.   Neurological: He is alert and oriented to person, place, and time.   Skin: Skin is warm and dry.   Shallow puncture wound to left and right thenar eminence. No oozing, no surrounding erythema    Psychiatric: He has a normal mood and affect.   Nursing note and vitals reviewed.     Assessment:       1. Cat bite, initial encounter    2. Need for diphtheria, tetanus, pertussis, and Hib vaccination        Plan:       Cat bite, initial encounter  -     amoxicillin-clavulanate 875-125mg (AUGMENTIN) 875-125 mg per tablet; Take 1 tablet by mouth 2 (two) times daily.  Dispense: 20 tablet; Refill: 0    Need for diphtheria, " tetanus, pertussis, and Hib vaccination  -     diphth,pertus,acell,,tetanus (BOOSTRIX) 2.5-8-5 Lf-mcg-Lf/0.5mL Susp; Inject 0.5 mLs into the muscle once. for 1 dose  Dispense: 0.5 mL; Refill: 0        Follow-up if symptoms worsen or fail to improve.      9/10/2018 NORMA Mock, FNP

## 2018-09-10 NOTE — PATIENT INSTRUCTIONS
Cat Bite    A cat bite can cause a wound deep enough to break the skin. In such cases, the wound is cleaned and then sometimes closed. If the wound is closed it is usually not closed completely. This is so that fluid can drain if the wound becomes infected. Often the wound is left open to heal. In addition to wound care, a tetanus shot may be given, if needed.  Home care  · Wash your hands well with soap and warm water before and after caring for the wound. This helps lower the risk of infection.  · Care for the wound as directed. If a dressing was applied to the wound, be sure to change it as directed.  · If the wound bleeds, place a clean, soft cloth on the wound. Then firmly apply pressure until the bleeding stops. This may take up to 5 minutes. Do not release the pressure and look at the wound during this time.  · Always get medical attention for cat bites on the hand. They are highly likely to become infected.  · Most wounds heal within 10 days. But an infection can occur even with proper treatment. So be sure to check the wound daily for signs of infection (see below).  · Antibiotics may be prescribed. These help prevent or treat infection. If youre given antibiotics, take them as directed. Also be sure to complete the medicines.  Rabies prevention  Rabies is a virus that can be carried in certain animals. These can include domestic animals such as cats and dogs. Pets fully vaccinated against rabies (2 shots) are at very low risk of infection. But because human rabies is almost always fatal, any biting pet should be confined for 10 days as an extra precaution. In general, if there is a risk for rabies, the following steps may need to be taken:  · If someones pet cat has bitten you, it should be kept in a secure area for the next 10 days to watch for signs of illness. (If the pet owner wont allow this, contact your local animal control center.) If the cat becomes ill or dies during that time, contact your  local animal control center at once so the animal may be tested for rabies. If the cat stays healthy for the next 10 days, there is no danger of rabies in the animal or you.  · If a stray cat bit you, contact your local animal control center. They can give information on capture, quarantine, and animal rabies testing.  · If you cant find the animal that bit you in the next 2 days, and if rabies exists in your area, you may need to receive the rabies vaccine series. Call your healthcare provider right away. Or return to the emergency department promptly.  · All animal bites should be reported to the local animal control center. If you were not given a form to fill out, you can report this yourself.  Follow-up care  Follow up with your healthcare provider, or as directed.  When to seek medical advice  Call your healthcare provider right away if any of these occur:  · Signs of infection:  ¨ Spreading redness or warmth from the wound  ¨ Increased pain or swelling  ¨ Fever of 100.4ºF (38ºC) or higher, or as directed by your healthcare provider  ¨ Colored fluid or pus draining from the wound  ¨ Enlarged lymph nodes above the area that was bitten, such as lymph nodes in the armpit if you were bitten on the hand or arm. This may be a sign of cat-scratch disease (cat-scratch fever).  · Signs of rabies infection:  ¨ Headache  ¨ Confusion  ¨ Strange behavior  ¨ Increased salivating or drooling  ¨ Seizure  · Decreased ability to move any body part near the bite area  · Bleeding that can't be stopped after 5 minutes of firm pressure  Date Last Reviewed: 3/23/2015  © 2498-2124 [employer]. [address] [place] [address], [place] [address]. All rights reserved. This information is not intended as a substitute for professional medical care. Always follow your healthcare professional's instructions.

## 2019-04-23 ENCOUNTER — OFFICE VISIT (OUTPATIENT)
Dept: ORTHOPEDICS | Facility: CLINIC | Age: 54
End: 2019-04-23
Payer: MEDICARE

## 2019-04-23 VITALS
SYSTOLIC BLOOD PRESSURE: 100 MMHG | DIASTOLIC BLOOD PRESSURE: 60 MMHG | HEART RATE: 65 BPM | WEIGHT: 165 LBS | BODY MASS INDEX: 25.9 KG/M2 | HEIGHT: 67 IN

## 2019-04-23 DIAGNOSIS — S60.221A CONTUSION OF RIGHT HAND, INITIAL ENCOUNTER: Primary | ICD-10-CM

## 2019-04-23 PROCEDURE — 99203 PR OFFICE/OUTPT VISIT, NEW, LEVL III, 30-44 MIN: ICD-10-PCS | Mod: ,,, | Performed by: ORTHOPAEDIC SURGERY

## 2019-04-23 PROCEDURE — 73130 X-RAY EXAM OF HAND: CPT | Mod: RT,,, | Performed by: ORTHOPAEDIC SURGERY

## 2019-04-23 PROCEDURE — 99203 OFFICE O/P NEW LOW 30 MIN: CPT | Mod: ,,, | Performed by: ORTHOPAEDIC SURGERY

## 2019-04-23 PROCEDURE — 73130 PR  X-RAY HAND 3+ VW: ICD-10-PCS | Mod: RT,,, | Performed by: ORTHOPAEDIC SURGERY

## 2019-04-23 RX ORDER — HYDROCODONE BITARTRATE AND ACETAMINOPHEN 10; 325 MG/1; MG/1
TABLET ORAL
Refills: 0 | COMMUNITY
Start: 2019-04-01 | End: 2019-04-23

## 2019-04-23 NOTE — PROGRESS NOTES
[facility]    Subjective:     Chief Complaint:   Chief Complaint   Patient presents with    Right Hand - Pain     Right hand/wrist pain x 3 weeks. He fell at home        Past Medical History:   Diagnosis Date    Migraine        Past Surgical History:   Procedure Laterality Date    COLONOSCOPY  ~2014    normal findings per patient report; unsure of provider    FRACTURE SURGERY      knee    UPPER GASTROINTESTINAL ENDOSCOPY  ~2016    Dr. Rocha, normal findings per patient report    UPPER GASTROINTESTINAL ENDOSCOPY  ~2017    Dr. Rocha, normal findings per patient report       Current Outpatient Medications   Medication Sig    hydrocodone-acetaminophen 5-325mg (NORCO) 5-325 mg per tablet Take 1 tablet by mouth every 12 (twelve) hours as needed for Pain.    mometasone (NASONEX) 50 mcg/actuation nasal spray 2 sprays by Nasal route once daily.    multivitamin (THERAGRAN) per tablet Take 1 tablet by mouth once daily.    ondansetron (ZOFRAN) 8 MG tablet Take 8 mg by mouth every 6 (six) hours.    ranitidine (ZANTAC) 150 MG tablet Take 1 tablet (150 mg total) by mouth 2 (two) times daily as needed for Heartburn.    verapamil (CALAN-SR) 120 MG CR tablet Take 120 mg by mouth every evening.     No current facility-administered medications for this visit.        Review of patient's allergies indicates:  No Known Allergies    Family History   Problem Relation Age of Onset    Hypertension Mother     Colon cancer Father 68    Crohn's disease Neg Hx     Esophageal cancer Neg Hx     Ulcerative colitis Neg Hx     Stomach cancer Neg Hx        Social History     Socioeconomic History    Marital status:      Spouse name: Not on file    Number of children: Not on file    Years of education: Not on file    Highest education level: Not on file   Occupational History    Occupation: disabled    Social Needs    Financial resource strain: Not on file    Food insecurity:     Worry: Not on file      Inability: Not on file    Transportation needs:     Medical: Not on file     Non-medical: Not on file   Tobacco Use    Smoking status: Never Smoker    Smokeless tobacco: Never Used   Substance and Sexual Activity    Alcohol use: No    Drug use: No    Sexual activity: Not on file   Lifestyle    Physical activity:     Days per week: Not on file     Minutes per session: Not on file    Stress: Not on file   Relationships    Social connections:     Talks on phone: Not on file     Gets together: Not on file     Attends Sikh service: Not on file     Active member of club or organization: Not on file     Attends meetings of clubs or organizations: Not on file     Relationship status: Not on file   Other Topics Concern    Not on file   Social History Narrative    Not on file       History of present illness: Patient comes in today for the right hand. She fell onto his right hand 3 weeks ago. He became dizzy. He has migraines. He's had some persistent discomfort over the right hand. Pain is primarily in the dorsal area of the right hand between the second and third metacarpals.      Review of Systems:    Constitution: Negative for chills, fever, and sweats.  Negative for unexplained weight loss.    HENT:  Negative for headaches and blurry vision.    Cardiovascular:Negative for chest pain or irregular heart beat. Negative for hypertension.    Respiratory:  Negative for cough and shortness of breath.    Gastrointestinal: Negative for abdominal pain, heartburn, melena, nausea, and vomitting.    Genitourinary:  Negative bladder incontinence and dysuria.    Musculoskeletal:  See HPI for details.     Neurological: Negative for numbness.    Psychiatric/Behavioral: Negative for depression.  The patient is not nervous/anxious.      Endocrine: Negative for polyuria    Hematologic/Lymphatic: Negative for bleeding problem.  Does not bruise/bleed easily.    Skin: Negative for poor would healing and rash    Objective:       Physical Examination:    Vital Signs:    Vitals:    04/23/19 0812   BP: 100/60   Pulse: 65       Body mass index is 25.84 kg/m².    This a well-developed, well nourished patient in no acute distress.  They are alert and oriented and cooperative to examination.        Patient has full extension of the fingers and wrist on the right side. Full flexion of the fingers and wrist on the right side. No crepitus. Is able to extend his thumb without difficulty. No pain with CMC grind's. Full range of motion of the elbows. Spurling sign is negative. Symmetric range of motion of the left hand.  Pertinent New Results:    XRAY Report / Interpretation:   AP oblique and lateral of the right hand demonstrate no fracture subluxations dislocations or foreign bodies   Assessment/Plan:      Contusion to the right hand. No intervention at this time. Follow-up in 3 weeks if he still symptomatic    This note was created using Dragon voice recognition software that occasionally misinterpreted phrases or words.

## 2019-10-28 ENCOUNTER — HOSPITAL ENCOUNTER (EMERGENCY)
Facility: HOSPITAL | Age: 54
Discharge: HOME | End: 2019-10-29
Attending: EMERGENCY MEDICINE
Payer: MEDICARE
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
## 2019-10-28 DIAGNOSIS — S39.012A STRAIN OF LUMBAR REGION, INITIAL ENCOUNTER: ICD-10-CM

## 2019-10-28 DIAGNOSIS — S16.1XXA CERVICAL STRAIN, ACUTE, INITIAL ENCOUNTER: ICD-10-CM

## 2019-10-28 PROCEDURE — 99284 EMERGENCY DEPT VISIT MOD MDM: CPT | Mod: 25

## 2019-10-28 PROCEDURE — 25000003 PHARM REV CODE 250: Performed by: PHYSICIAN ASSISTANT

## 2019-10-28 RX ORDER — MELOXICAM 7.5 MG/1
7.5 TABLET ORAL DAILY
Qty: 30 TABLET | Refills: 0 | Status: SHIPPED | OUTPATIENT
Start: 2019-10-28 | End: 2024-03-11

## 2019-10-28 RX ORDER — METHOCARBAMOL 500 MG/1
1000 TABLET, FILM COATED ORAL
Status: COMPLETED | OUTPATIENT
Start: 2019-10-28 | End: 2019-10-28

## 2019-10-28 RX ORDER — ACETAMINOPHEN 500 MG
1000 TABLET ORAL
Status: COMPLETED | OUTPATIENT
Start: 2019-10-28 | End: 2019-10-28

## 2019-10-28 RX ORDER — METHOCARBAMOL 500 MG/1
1000 TABLET, FILM COATED ORAL 3 TIMES DAILY
Qty: 30 TABLET | Refills: 0 | Status: SHIPPED | OUTPATIENT
Start: 2019-10-28 | End: 2019-11-02

## 2019-10-28 RX ADMIN — METHOCARBAMOL TABLETS 1000 MG: 500 TABLET, COATED ORAL at 09:10

## 2019-10-28 RX ADMIN — ACETAMINOPHEN 1000 MG: 500 TABLET, FILM COATED ORAL at 09:10

## 2019-10-29 VITALS
HEIGHT: 67 IN | RESPIRATION RATE: 18 BRPM | DIASTOLIC BLOOD PRESSURE: 81 MMHG | HEART RATE: 60 BPM | BODY MASS INDEX: 26.68 KG/M2 | OXYGEN SATURATION: 95 % | SYSTOLIC BLOOD PRESSURE: 127 MMHG | WEIGHT: 170 LBS | TEMPERATURE: 98 F
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
## 2020-12-07 ENCOUNTER — LAB VISIT (OUTPATIENT)
Dept: LAB | Facility: HOSPITAL | Age: 55
End: 2020-12-07
Attending: STUDENT IN AN ORGANIZED HEALTH CARE EDUCATION/TRAINING PROGRAM
Payer: MEDICARE

## 2020-12-07 ENCOUNTER — OFFICE VISIT (OUTPATIENT)
Dept: FAMILY MEDICINE | Facility: CLINIC | Age: 55
End: 2020-12-07
Payer: MEDICARE

## 2020-12-07 VITALS
DIASTOLIC BLOOD PRESSURE: 74 MMHG | RESPIRATION RATE: 16 BRPM | TEMPERATURE: 98 F | WEIGHT: 170.63 LBS | HEIGHT: 67 IN | HEART RATE: 66 BPM | BODY MASS INDEX: 26.78 KG/M2 | OXYGEN SATURATION: 97 % | SYSTOLIC BLOOD PRESSURE: 98 MMHG

## 2020-12-07 DIAGNOSIS — R79.9 BLOOD CHEMISTRY ABNORMALITY: ICD-10-CM

## 2020-12-07 DIAGNOSIS — K21.9 GASTROESOPHAGEAL REFLUX DISEASE, UNSPECIFIED WHETHER ESOPHAGITIS PRESENT: ICD-10-CM

## 2020-12-07 DIAGNOSIS — Z12.5 ENCOUNTER FOR SCREENING FOR MALIGNANT NEOPLASM OF PROSTATE: ICD-10-CM

## 2020-12-07 DIAGNOSIS — R53.83 FATIGUE, UNSPECIFIED TYPE: ICD-10-CM

## 2020-12-07 DIAGNOSIS — F33.41 RECURRENT MAJOR DEPRESSIVE DISORDER, IN PARTIAL REMISSION: ICD-10-CM

## 2020-12-07 DIAGNOSIS — Z13.220 SCREENING FOR LIPID DISORDERS: ICD-10-CM

## 2020-12-07 DIAGNOSIS — Z12.11 COLON CANCER SCREENING: ICD-10-CM

## 2020-12-07 DIAGNOSIS — Z11.59 NEED FOR HEPATITIS C SCREENING TEST: ICD-10-CM

## 2020-12-07 DIAGNOSIS — G43.809 OTHER MIGRAINE WITHOUT STATUS MIGRAINOSUS, NOT INTRACTABLE: ICD-10-CM

## 2020-12-07 DIAGNOSIS — Z91.89 AT RISK FOR OBSTRUCTIVE SLEEP APNEA: ICD-10-CM

## 2020-12-07 DIAGNOSIS — Z00.00 ENCOUNTER FOR PREVENTIVE HEALTH EXAMINATION: Primary | ICD-10-CM

## 2020-12-07 DIAGNOSIS — Z91.89 AT INCREASED RISK OF EXPOSURE TO COVID-19 VIRUS: Primary | ICD-10-CM

## 2020-12-07 DIAGNOSIS — Z11.52 ENCOUNTER FOR SCREENING LABORATORY TESTING FOR COVID-19 VIRUS: ICD-10-CM

## 2020-12-07 DIAGNOSIS — M77.01 MEDIAL EPICONDYLITIS OF RIGHT ELBOW: ICD-10-CM

## 2020-12-07 DIAGNOSIS — Z12.5 ENCOUNTER FOR PROSTATE CANCER SCREENING: ICD-10-CM

## 2020-12-07 DIAGNOSIS — R79.9 ABNORMAL FINDING OF BLOOD CHEMISTRY, UNSPECIFIED: ICD-10-CM

## 2020-12-07 DIAGNOSIS — N28.89 RENAL MASS: ICD-10-CM

## 2020-12-07 DIAGNOSIS — Z00.00 ENCOUNTER FOR PREVENTIVE HEALTH EXAMINATION: ICD-10-CM

## 2020-12-07 PROBLEM — G43.909 MIGRAINE WITHOUT STATUS MIGRAINOSUS, NOT INTRACTABLE: Status: ACTIVE | Noted: 2020-12-07

## 2020-12-07 LAB
ALBUMIN SERPL BCP-MCNC: 4.7 G/DL (ref 3.5–5.2)
ALP SERPL-CCNC: 61 U/L (ref 55–135)
ALT SERPL W/O P-5'-P-CCNC: 16 U/L (ref 10–44)
ANION GAP SERPL CALC-SCNC: 8 MMOL/L (ref 8–16)
AST SERPL-CCNC: 16 U/L (ref 10–40)
BASOPHILS # BLD AUTO: 0.03 K/UL (ref 0–0.2)
BASOPHILS NFR BLD: 0.4 % (ref 0–1.9)
BILIRUB SERPL-MCNC: 1.5 MG/DL (ref 0.1–1)
BUN SERPL-MCNC: 19 MG/DL (ref 6–20)
CALCIUM SERPL-MCNC: 9.9 MG/DL (ref 8.7–10.5)
CHLORIDE SERPL-SCNC: 106 MMOL/L (ref 95–110)
CHOLEST SERPL-MCNC: 219 MG/DL (ref 120–199)
CHOLEST/HDLC SERPL: 5.8 {RATIO} (ref 2–5)
CO2 SERPL-SCNC: 26 MMOL/L (ref 23–29)
COMPLEXED PSA SERPL-MCNC: 0.24 NG/ML (ref 0–4)
CREAT SERPL-MCNC: 1.2 MG/DL (ref 0.5–1.4)
DIFFERENTIAL METHOD: ABNORMAL
EOSINOPHIL # BLD AUTO: 0 K/UL (ref 0–0.5)
EOSINOPHIL NFR BLD: 0.4 % (ref 0–8)
ERYTHROCYTE [DISTWIDTH] IN BLOOD BY AUTOMATED COUNT: 12.5 % (ref 11.5–14.5)
EST. GFR  (AFRICAN AMERICAN): >60 ML/MIN/1.73 M^2
EST. GFR  (NON AFRICAN AMERICAN): >60 ML/MIN/1.73 M^2
ESTIMATED AVG GLUCOSE: 94 MG/DL (ref 68–131)
FERRITIN SERPL-MCNC: 97 NG/ML (ref 20–300)
GLUCOSE SERPL-MCNC: 96 MG/DL (ref 70–110)
HBA1C MFR BLD HPLC: 4.9 % (ref 4.5–6.2)
HCT VFR BLD AUTO: 46.3 % (ref 40–54)
HDLC SERPL-MCNC: 38 MG/DL (ref 40–75)
HDLC SERPL: 17.4 % (ref 20–50)
HGB BLD-MCNC: 15.7 G/DL (ref 14–18)
IMM GRANULOCYTES # BLD AUTO: 0.03 K/UL (ref 0–0.04)
IMM GRANULOCYTES NFR BLD AUTO: 0.4 % (ref 0–0.5)
IRON SERPL-MCNC: 114 UG/DL (ref 45–160)
LDLC SERPL CALC-MCNC: 148 MG/DL (ref 63–159)
LYMPHOCYTES # BLD AUTO: 1.3 K/UL (ref 1–4.8)
LYMPHOCYTES NFR BLD: 15.7 % (ref 18–48)
MCH RBC QN AUTO: 29.7 PG (ref 27–31)
MCHC RBC AUTO-ENTMCNC: 33.9 G/DL (ref 32–36)
MCV RBC AUTO: 88 FL (ref 82–98)
MONOCYTES # BLD AUTO: 0.6 K/UL (ref 0.3–1)
MONOCYTES NFR BLD: 6.6 % (ref 4–15)
NEUTROPHILS # BLD AUTO: 6.4 K/UL (ref 1.8–7.7)
NEUTROPHILS NFR BLD: 76.5 % (ref 38–73)
NONHDLC SERPL-MCNC: 181 MG/DL
NRBC BLD-RTO: 0 /100 WBC
PLATELET # BLD AUTO: 301 K/UL (ref 150–350)
PMV BLD AUTO: 11.1 FL (ref 9.2–12.9)
POTASSIUM SERPL-SCNC: 4.3 MMOL/L (ref 3.5–5.1)
PROT SERPL-MCNC: 7.5 G/DL (ref 6–8.4)
RBC # BLD AUTO: 5.28 M/UL (ref 4.6–6.2)
SATURATED IRON: 28 % (ref 20–50)
SODIUM SERPL-SCNC: 140 MMOL/L (ref 136–145)
TOTAL IRON BINDING CAPACITY: 405 UG/DL (ref 250–450)
TRANSFERRIN SERPL-MCNC: 289 MG/DL (ref 200–375)
TRIGL SERPL-MCNC: 165 MG/DL (ref 30–150)
TSH SERPL DL<=0.005 MIU/L-ACNC: 1.3 UIU/ML (ref 0.34–5.6)
WBC # BLD AUTO: 8.35 K/UL (ref 3.9–12.7)

## 2020-12-07 PROCEDURE — 99205 OFFICE O/P NEW HI 60 MIN: CPT | Mod: S$PBB,,, | Performed by: STUDENT IN AN ORGANIZED HEALTH CARE EDUCATION/TRAINING PROGRAM

## 2020-12-07 PROCEDURE — 83540 ASSAY OF IRON: CPT

## 2020-12-07 PROCEDURE — 83036 HEMOGLOBIN GLYCOSYLATED A1C: CPT

## 2020-12-07 PROCEDURE — 85025 COMPLETE CBC W/AUTO DIFF WBC: CPT

## 2020-12-07 PROCEDURE — 99999 PR PBB SHADOW E&M-EST. PATIENT-LVL V: CPT | Mod: PBBFAC,,, | Performed by: STUDENT IN AN ORGANIZED HEALTH CARE EDUCATION/TRAINING PROGRAM

## 2020-12-07 PROCEDURE — 99205 PR OFFICE/OUTPT VISIT, NEW, LEVL V, 60-74 MIN: ICD-10-PCS | Mod: S$PBB,,, | Performed by: STUDENT IN AN ORGANIZED HEALTH CARE EDUCATION/TRAINING PROGRAM

## 2020-12-07 PROCEDURE — 80053 COMPREHEN METABOLIC PANEL: CPT

## 2020-12-07 PROCEDURE — 80061 LIPID PANEL: CPT

## 2020-12-07 PROCEDURE — 99215 OFFICE O/P EST HI 40 MIN: CPT | Mod: PBBFAC,PO | Performed by: STUDENT IN AN ORGANIZED HEALTH CARE EDUCATION/TRAINING PROGRAM

## 2020-12-07 PROCEDURE — 86769 SARS-COV-2 COVID-19 ANTIBODY: CPT

## 2020-12-07 PROCEDURE — 86803 HEPATITIS C AB TEST: CPT

## 2020-12-07 PROCEDURE — 84443 ASSAY THYROID STIM HORMONE: CPT

## 2020-12-07 PROCEDURE — 99999 PR PBB SHADOW E&M-EST. PATIENT-LVL V: ICD-10-PCS | Mod: PBBFAC,,, | Performed by: STUDENT IN AN ORGANIZED HEALTH CARE EDUCATION/TRAINING PROGRAM

## 2020-12-07 PROCEDURE — 82728 ASSAY OF FERRITIN: CPT

## 2020-12-07 PROCEDURE — 84153 ASSAY OF PSA TOTAL: CPT

## 2020-12-07 RX ORDER — METOCLOPRAMIDE 10 MG/1
10 TABLET ORAL
COMMUNITY
End: 2024-03-11

## 2020-12-07 RX ORDER — DICLOFENAC SODIUM 10 MG/G
2 GEL TOPICAL 4 TIMES DAILY
Qty: 100 G | Refills: 1 | Status: SHIPPED | OUTPATIENT
Start: 2020-12-07 | End: 2024-03-11

## 2020-12-07 RX ORDER — RIZATRIPTAN BENZOATE 10 MG/1
TABLET, ORALLY DISINTEGRATING ORAL
COMMUNITY

## 2020-12-07 RX ORDER — FLUOXETINE HYDROCHLORIDE 40 MG/1
40 CAPSULE ORAL DAILY
COMMUNITY
End: 2024-03-11

## 2020-12-07 RX ORDER — ERENUMAB-AOOE 140 MG/ML
140 INJECTION, SOLUTION SUBCUTANEOUS
COMMUNITY
End: 2024-03-11

## 2020-12-07 RX ORDER — MIRTAZAPINE 15 MG/1
15 TABLET, FILM COATED ORAL NIGHTLY
COMMUNITY
End: 2024-03-11

## 2020-12-07 RX ORDER — PROPRANOLOL HYDROCHLORIDE 60 MG/1
CAPSULE, EXTENDED RELEASE ORAL
COMMUNITY
End: 2024-03-11

## 2020-12-07 RX ORDER — ONDANSETRON 4 MG/1
TABLET, ORALLY DISINTEGRATING ORAL
COMMUNITY
End: 2024-03-11

## 2020-12-07 RX ORDER — ONABOTULINUMTOXINA 100 [USP'U]/1
INJECTION, POWDER, LYOPHILIZED, FOR SOLUTION INTRADERMAL; INTRAMUSCULAR ONCE
COMMUNITY
End: 2024-03-11

## 2020-12-07 NOTE — PROGRESS NOTES
"[facility] Note  Subjective:    Chief Complaint:   Chief Complaint   Patient presents with    Annual Exam    Establish Care    Fatigue       History of Present Illness:  55 y.o. male presents for multiple issues.     Documentation entered by me for this encounter may have been done in part using speech-recognition technology. Although I have made an effort to ensure accuracy, "sound like" errors may exist and should be interpreted in context.      Fatigue-present for the last 2 weeks associated with decreased appetite and malaise with no other symptoms such as viral symptoms.  He reports he has lost about 5 lb and no fevers night sweats or chills.  He was never smoker and has no cough.  He denies any new pain such as abdominal pain her blood or black in his stool.  He does not note nausea or vomiting but does get nauseous during his typical migraines which he sees a neurologist for.  No diarrhea constant patient currently.  He denies swore fever chills eyes Zita another viral symptoms.  He reports he does have a history of depression but he takes his SSRI for this and does not feel it is the same.  He is not taking any new medicine except this typical migraine prophylaxis an abortive medications.  No blood in his urine.  He did have a mass in his kidney and this is being followed up by urologist with imaging every 6 months and this is a cyst nothing to were about he reports.  Will get basic blood work to look for any allergy or cause.  Also get sleep study since he reports that he does have snoring and he does have headaches which is associated with sleep apnea.   Migraines-stable continue prophylactic medications no new focal deficits and these migraines are typical none currently and no red flags noted in history.  Neuro exam did not elicit any deficits   medial epicondylitis-pain over the epicondyle will start Voltaren cream recommended exercise   GERD-stable on current medications no blood " back in stool will continue OTC meds    Preventive-get blood work today and also put her for possibly as well does not want vaccinations today    He denies unilateral weakness, slurred speech, facial droop, and new onset numbness. No fever, neck stiffness, or neck pain. He does not endorse seizures, confusion, morning vomiting, or weight loss. No jaw claudication, vision changes, temporal tenderness, or eye pain. He denies sudden onset, maximum intensity of pain at onset, and head trauma.   This is the extent of the patient's complaints at this present time.     He denies chest pain upon exertion, dyspnea, nausea, vomiting, diaphoresis, and syncope. No pleuritic chest pain, unilateral leg swelling, calf tenderness, or calf pain.     He denies having a family history of cancer.     The following portions of the patient's history were reviewed and updated as appropriate: allergies, current medications, past family history, past medical history, past social history, past surgical history and problem list.    Review of Systems [Negative unless checked off]  Gen ROS: []fever []chills []weight loss [x]malaise [x]fatigue   Neuro: []dizzy []numbness []LOC []weakness [x]headaches   Psych ROS: []hallucinate [] depression []anxiety []suicidal ideation    ENT ROS: []congestion []rhinorrhea []sore throat []neck pain []hearing loss   Eye ROS: []hazy vision [] diplopia []photophobia []eye pain    Pulm ROS: []cough []pleuritic pain []dyspnea []wheezing    [facility] : []chest pain []SOB on exertion []orthopnea []PND []leg swelling   GI ROS: []n/v []abd pain []diarrhea []constipation []blood/black stool   Uro ROS: []dysuria []frequency []flank pain [] trouble voiding []hematuria   MSK ROS: []myalgias [x]joint pain []neck pain []back pain [] falls   Derm ROS: []pruritis []rash []jaundice        Past Medical History:   Diagnosis Date    GERD (gastroesophageal reflux disease)     Migraine      Past Surgical History:   Procedure Laterality  "Date    COLONOSCOPY  ~2014    normal findings per patient report; unsure of provider    FRACTURE SURGERY      knee    UPPER GASTROINTESTINAL ENDOSCOPY  ~2016    Dr. Rocha, normal findings per patient report    UPPER GASTROINTESTINAL ENDOSCOPY  ~2017    Dr. Rocha, normal findings per patient report     Social History  Social History     Tobacco Use    Smoking status: Never Smoker    Smokeless tobacco: Never Used   Substance Use Topics    Alcohol use: No    Drug use: No     Family History   Problem Relation Age of Onset    Hypertension Mother     Colon cancer Father 68    Crohn's disease Neg Hx     Esophageal cancer Neg Hx     Ulcerative colitis Neg Hx     Stomach cancer Neg Hx      Review of patient's allergies indicates:  No Known Allergies    Physical Examination  BP 98/74 (BP Location: Right arm, Patient Position: Sitting, BP Method: Large (Manual))   Pulse 66   Temp 98 °F (36.7 °C) (Temporal)   Resp 16   Ht 5' 7" (1.702 m)   Wt 77.4 kg (170 lb 10.2 oz)   SpO2 97%   BMI 26.73 kg/m²   Wt Readings from Last 3 Encounters:   12/07/20 77.4 kg (170 lb 10.2 oz)   10/28/19 77.1 kg (170 lb)   04/23/19 74.8 kg (165 lb)     BP Readings from Last 3 Encounters:   12/07/20 98/74   10/29/19 127/81   04/23/19 100/60     Estimated body mass index is 26.73 kg/m² as calculated from the following:    Height as of this encounter: 5' 7" (1.702 m).    Weight as of this encounter: 77.4 kg (170 lb 10.2 oz).     General appearance: alert, cooperative, no distress  Eyes: pupils equal and reactive, extraocular eye movements intact   Ears: bilateral TM's and external ear canals normal   Nose: normal and patent, no erythema, discharge or polyps   Sinuses: Normal paranasal sinuses without tenderness   Throat: mucous membranes moist, pharynx normal without lesions   Neck: no thyromegaly, trachea midline  Lungs: clear to auscultation, no wheezes, rales or rhonchi, symmetric air entry, no dullness to percussion " bilaterally.  Heart: normal rate, regular rhythm, normal S1, S2, no murmurs, rubs, clicks or gallops, no displacement of the PMI.  Abdomen: soft, nontender, nondistended, no masses or organomegaly No rigidity, rebound, or guarding.   Back: full range of motion, no tenderness, palpable spasm or pain on motion   Extremities: peripheral pulses normal, no pedal edema, no clubbing or cyanosis   Feet: warm, good capillary refill.  Neurological:alert, oriented, normal speech, no focal findings or movement disorder noted   Psychiatric: alert, oriented to person, place, and time  Integument: normal coloration and turgor, no rashes, no suspicious skin lesions noted.    Neuro:               MS: alert, awake, oriented x3. Speech and thought process intact.  CN: PERRLA. Pupils constricted normally to light. Pupils constricted when looking at a near object, dilated when looking at a distant object, converged when my finger moved towards the nose.  Visual fields intact. EOMI. Sensation to light touch in all trigeminal divisions intact. Able to closed eyes tightly and smile without asymmetry. Hearing intact. No uvula deviation. 5/5 shoulder shrugs. Tongue able to protrude bilaterally without difficulty.   Motor: 5/5 all ext strength.  Sensation: to light touch intact throughout.   Reflex: 2+ bilateral bicep tendon and patellar reflexes.  Coordination: Intact finger-to-nose, walked with normal gait across room.       Data reviewed    Previous medical records reviewed and summarized above in HPI.     Laboratory    I have reviewed old labs below:    Lab Results   Component Value Date    WBC 7.10 02/26/2018    HGB 15.0 02/26/2018    HCT 44.3 02/26/2018    MCV 89 02/26/2018     02/26/2018     02/26/2018    K 4.4 02/26/2018     02/26/2018    CALCIUM 9.6 02/26/2018    CO2 27 02/26/2018    GLU 98 02/26/2018    BUN 16 02/26/2018    CREATININE 1.1 02/26/2018    ANIONGAP 7 (L) 02/26/2018    ESTGFRAFRICA >60 02/26/2018     EGFRNONAA >60 02/26/2018    PROT 7.0 02/26/2018    ALBUMIN 4.1 02/26/2018    BILITOT 0.8 02/26/2018    ALKPHOS 56 02/26/2018    ALT 13 02/26/2018    AST 13 02/26/2018    INR 1.0 01/29/2008    CHOL 157 01/29/2008    TRIG 86 01/29/2008    HDL 46 01/29/2008    LDLCALC 93.8 01/29/2008    TSH 0.926 02/26/2018    HGBA1C 4.7 01/29/2008     Lab reviewed by me: Particular labs of significance that I will monitor, workup, or treat to improve are marked below.     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 is a 55 y.o. male who presents to clinic with:    1. Encounter for preventive health examination    2. Medial epicondylitis of right elbow    3. Gastroesophageal reflux disease, unspecified whether esophagitis present    4. Fatigue, unspecified type    5. Renal mass    6. Other migraine without status migrainosus, not intractable    7. Encounter for prostate cancer screening    8. Screening for lipid disorders    9. Need for hepatitis C screening test    10. Blood chemistry abnormality    11. Encounter for screening for malignant neoplasm of prostate     12. Recurrent major depressive disorder, in partial remission    13. Abnormal finding of blood chemistry, unspecified     14. At risk for obstructive sleep apnea    15. Colon cancer screening    16. Encounter for screening laboratory testing for COVID-19 virus         Diagnostic impression remarks      Fatigue-present for the last 2 weeks associated with decreased appetite and malaise with no other symptoms such as viral symptoms.  He reports he has lost about 5 lb and no fevers night sweats or chills.  He was never smoker and has no cough.  He denies any new pain such as abdominal pain her blood or black in his stool.  He does not note nausea or vomiting but does get nauseous during his typical migraines which he sees a neurologist for.  No diarrhea constant patient  currently.  He denies swore fever chills eyes Zita another viral symptoms.  He reports he does have a history of depression but he takes his SSRI for this and does not feel it is the same.  He is not taking any new medicine except this typical migraine prophylaxis an abortive medications.  No blood in his urine.  He did have a mass in his kidney and this is being followed up by urologist with imaging every 6 months and this is a cyst nothing to were about he reports.  Will get basic blood work to look for any allergy or cause.  Also get sleep study since he reports that he does have snoring and he does have headaches which is associated with sleep apnea.   Migraines-stable continue prophylactic medications no new focal deficits and these migraines are typical none currently and no red flags noted in history.  Neuro exam did not elicit any deficits   medial epicondylitis-pain over the epicondyle will start Voltaren cream recommended exercise   GERD-stable on current medications no blood back in stool will continue OTC meds    Depression-stable continue SSRI and do not feel that this is his clear cause for decreased appetite.  Can continue remeron.  Consider replace SSRI with Wellbutrin and get the since this can help with energy levels.   Preventive-get blood work today and also put her for possibly as well does not want vaccinations today    I have very low suspicion for alternative causes of headache such as intracranial hemorrhage, ischemic process, meningitis, temporal arteritis, or mass. I do not suspect an intracranial hemorrhage since he does not endorse a sudden onset severe headache, thunderclap headache, or worse headache of his life. An ischemic process is low on the differential since he has no new unilateral weakness, slurred speech, facial droop, or new onset numbness. Meningitis is unlikely since he denies fever, photophobia, confusion, and no neck stiffness was appreciate on his exam. My suspicion  "for temporal arteritis is low since he has no jaw claudication, unilateral vision loss, temporal tenderness, or eye pain. I also do not suspect a mass since he denies seizures, confusion, morning vomiting, and unintentional weight loss. I do not suspect a carotid or vertebral artery dissection since the pain does not radiate to the neck and I did not appreciate Derek syndrome. Glaucoma is low on my differential since there is no eye redness or tearing. The patient has a non-focal neurological examination with history and I do not think further brain imaging or lumbar puncture is indicated at this time.   BMI Goal    Counseled patient on his ideal body weight, health consequences of being obese and current recommendations including weekly exercise and a heart healthy diet.  He is aware that ideal BMI < 25  Estimated body mass index is 26.73 kg/m² as calculated from the following:    Height as of this encounter: 5' 7" (1.702 m).    Weight as of this encounter: 77.4 kg (170 lb 10.2 oz).     He was counseled about the importance of healthy dietary habits as well as routine physical activity and exercise for better health outcomes. I also discussed the importance of cancer screening.     Medication Monitoring    In today's visit, monitoring for drug toxicity was accomplished. Proper use of medications was also discussed.     Counseling    Eat Less Unhealthy Fat   -Cut back on saturated fats and trans (also called hydrogenated) fats. A diet thats high in these fats increases your bad cholesterol. Its not enough to just cut back on foods containing cholesterol.   -Eat about 2 servings of fish per week. Most fish contain omega-3 fatty acids. These help lower blood cholesterol.   -Eat more whole grains and soluble fiber (such as oat bran). These lower overall cholesterol.   -Counseled that most cholesterol is made in the liver and only a minority comes from diet.    Be Active   -Choose an activity you enjoy. Walking, " swimming, and riding a bike are some good ways to be active.   -Start at a level where you feel comfortable. Increase your time and pace a little each week.   -Work up to 30 minutes on most days. You can break this up into three 10-minute periods.   -Remember, some activity is better than none.   -If you havent been exercising regularly, start slowly. Check with your doctor to make sure the exercise plan is right for you.     Take Medication As Directed: Many patients need medication to get their LDL levels to a safe level. Medication to lower cholesterol levels is effective and safe. (But taking medication is not a substitute for exercise or watching your diet!).    Altogether 60 minutes were spent with Srinath, over half of which was spent in counseling on diagnosis, prognosis, and treatment options.I also counseled him on common and the most usual side effect of prescribed medications. Risk and benefits of the medication were also discussed. I reviewed previous notes to better coordinate patient's care. He test results and lifestyle changes were also discussed. All questions were answered, and patient voiced understanding.       I discussed imaging findings, diagnosis, possibilities, treatment options, medications, risks, and benefits. He had many questions regarding the options and long-term effects. All questions were answered. He expressed understanding after counseling regarding the diagnosis and recommendations. He was capable and demonstrated competence with understanding of these options. Shared decision making was performed resulting in him choosing the current treatment plan.     I also discussed the importance of close follow up to discuss labs, change or modify his medications if needed, monitor side effects, and further evaluation of medical problems.     Additional workup planned: see labs ordered below.    See below for labs and meds ordered with associated diagnosis    1. Encounter for preventive  health examination  - Hemoglobin A1C; Future  - PSA, Screening; Future    2. Medial epicondylitis of right elbow  - diclofenac sodium (VOLTAREN) 1 % Gel; Apply 2 g topically 4 (four) times daily.  Dispense: 100 g; Refill: 1    3. Gastroesophageal reflux disease, unspecified whether esophagitis present    4. Fatigue, unspecified type  - CBC Auto Differential; Future  - Hemoglobin A1C; Future  - Comprehensive Metabolic Panel; Future  - Ferritin; Future  - TSH; Future  - Iron and TIBC; Future  - COVID-19 (SARS CoV-2) IgG Antibody; Future    5. Renal mass    6. Other migraine without status migrainosus, not intractable    7. Encounter for prostate cancer screening    8. Screening for lipid disorders  - Lipid Panel; Future    9. Need for hepatitis C screening test  - Hepatitis C antibody; Future    10. Blood chemistry abnormality  - Lipid Panel; Future  - CBC Auto Differential; Future  - Hemoglobin A1C; Future  - Comprehensive Metabolic Panel; Future  - PSA, Screening; Future  - Ferritin; Future  - TSH; Future    11. Encounter for screening for malignant neoplasm of prostate   - PSA, Screening; Future    12. Recurrent major depressive disorder, in partial remission    13. Abnormal finding of blood chemistry, unspecified   - Iron and TIBC; Future    14. At risk for obstructive sleep apnea  - Ambulatory referral/consult to Pulmonology; Future    15. Colon cancer screening  - Ambulatory referral/consult to Gastroenterology; Future    16. Encounter for screening laboratory testing for COVID-19 virus  - COVID-19 (SARS CoV-2) IgG Antibody; Future       Medication List with Changes/Refills   New Medications    DICLOFENAC SODIUM (VOLTAREN) 1 % GEL    Apply 2 g topically 4 (four) times daily.   Current Medications    ERENUMAB-AOOE (AIMOVIG AUTOINJECTOR) 140 MG/ML AUTOINJECTOR    Inject 140 mg into the skin every 28 days.    FLUOXETINE 40 MG CAPSULE    Take 40 mg by mouth once daily.    HYDROCODONE-ACETAMINOPHEN 5-325MG (NORCO)  5-325 MG PER TABLET    Take 1 tablet by mouth every 12 (twelve) hours as needed for Pain.    MELOXICAM (MOBIC) 7.5 MG TABLET    Take 1 tablet (7.5 mg total) by mouth once daily.    METOCLOPRAMIDE HCL (REGLAN) 10 MG TABLET    Take 10 mg by mouth 3 (three) times daily before meals.    MIRTAZAPINE (REMERON) 15 MG TABLET    Take 15 mg by mouth every evening.    MOMETASONE (NASONEX) 50 MCG/ACTUATION NASAL SPRAY    2 sprays by Nasal route once daily.    MULTIVITAMIN (THERAGRAN) PER TABLET    Take 1 tablet by mouth once daily.    ONABOTULINUMTOXINA (BOTOX) 100 UNIT SOLR    once.    ONDANSETRON (ZOFRAN) 8 MG TABLET    Take 8 mg by mouth every 6 (six) hours.    ONDANSETRON (ZOFRAN-ODT) 4 MG TBDL    ondansetron 4 mg disintegrating tablet   DISSOLVE 1 TABLET BY MOUTH EVERY 6 HOURS    PROPRANOLOL (INDERAL LA) 60 MG 24 HR CAPSULE    propranolol ER 60 mg capsule,24 hr,extended release   TAKE ONE CAPSULE BY MOUTH EVERY DAY    RANITIDINE (ZANTAC) 150 MG TABLET    Take 1 tablet (150 mg total) by mouth 2 (two) times daily as needed for Heartburn.    RIZATRIPTAN (MAXALT-MLT) 10 MG DISINTEGRATING TABLET    rizatriptan 10 mg disintegrating tablet   TAKE 1 TAB BY MOUTH AT ONSET OF HEADACHE MAY TAKE 2ND TAB IN 2 HOURS IF NEEDED MAX OF 2 TABS PER DAY    VERAPAMIL (CALAN-SR) 120 MG CR TABLET    Take 120 mg by mouth every evening.     Modified Medications    No medications on file       No follow-ups on file. for further workup and reassessment if labs and tests obtained are stable or sooner as needed. He was instructed to call the clinic or go to the emergency department if his symptoms do not improve, worsens, or if new symptoms develop. As we discussed that symptoms could worsen over the next 24 hours he was advised that if any increased swelling, pain, or numbness arise to go immediately to the ED. Patient knows to call any time if an emergency arises. Shared decision making occurred and he verbalized understanding in agreement with this  "plan.     Documentation entered by me for this encounter may have been done in part using speech-recognition technology. Although I have made an effort to ensure accuracy, "sound like" errors may exist and should be interpreted in context.     Jeanmarie Young MD  12/07/2020     "

## 2020-12-08 ENCOUNTER — TELEPHONE (OUTPATIENT)
Dept: FAMILY MEDICINE | Facility: CLINIC | Age: 55
End: 2020-12-08

## 2020-12-08 ENCOUNTER — PATIENT MESSAGE (OUTPATIENT)
Dept: GASTROENTEROLOGY | Facility: CLINIC | Age: 55
End: 2020-12-08

## 2020-12-08 ENCOUNTER — LAB VISIT (OUTPATIENT)
Dept: URGENT CARE | Facility: CLINIC | Age: 55
End: 2020-12-08
Payer: MEDICARE

## 2020-12-08 DIAGNOSIS — R53.83 FATIGUE, UNSPECIFIED TYPE: ICD-10-CM

## 2020-12-08 DIAGNOSIS — Z91.89 AT INCREASED RISK OF EXPOSURE TO COVID-19 VIRUS: ICD-10-CM

## 2020-12-08 LAB
HCV AB S/CO SERPL IA: <0.1 S/CO RATIO (ref 0–0.9)
SARS-COV-2 IGG SERPLBLD QL IA.RAPID: NEGATIVE

## 2020-12-08 PROCEDURE — U0003 INFECTIOUS AGENT DETECTION BY NUCLEIC ACID (DNA OR RNA); SEVERE ACUTE RESPIRATORY SYNDROME CORONAVIRUS 2 (SARS-COV-2) (CORONAVIRUS DISEASE [COVID-19]), AMPLIFIED PROBE TECHNIQUE, MAKING USE OF HIGH THROUGHPUT TECHNOLOGIES AS DESCRIBED BY CMS-2020-01-R: HCPCS

## 2020-12-08 NOTE — TELEPHONE ENCOUNTER
----- Message from Maddy Cook sent at 12/8/2020  8:27 AM CST -----  Regarding: PT  Contact: PT  PT returned a missed call regarding his lab results. Please call back     Callback: [phone]

## 2020-12-08 NOTE — TELEPHONE ENCOUNTER
----- Message from Jeanmarie Young MD sent at 12/8/2020  7:45 AM CST -----  Lab results unremarkable for etiology for fatigue. Please call patient to notify of normal results except mildly elevated lipid levels and he should get a COVID PCR test to this out as a cause for his fatigue.     Jeanmarie Young MD  12/08/2020

## 2020-12-08 NOTE — PROGRESS NOTES
Lab results unremarkable for etiology for fatigue. Please call patient to notify of normal results except mildly elevated lipid levels and he should get a COVID PCR test to this out as a cause for his fatigue.     Jeanmarie Young MD  12/08/2020

## 2020-12-09 ENCOUNTER — TELEPHONE (OUTPATIENT)
Dept: FAMILY MEDICINE | Facility: CLINIC | Age: 55
End: 2020-12-09

## 2020-12-09 NOTE — TELEPHONE ENCOUNTER
----- Message from Khalida Nix sent at 12/9/2020  1:47 PM CST -----  Type: Needs Medical Advice  Who Called:  patient  Symptoms (please be specific):    How long has patient had these symptoms:    Pharmacy name and phone #:    Best Call Back Number: [phone]  Additional Information: requesting a call back to discuss test results

## 2020-12-10 ENCOUNTER — TELEPHONE (OUTPATIENT)
Dept: FAMILY MEDICINE | Facility: CLINIC | Age: 55
End: 2020-12-10

## 2020-12-10 LAB — SARS-COV-2 RNA RESP QL NAA+PROBE: NOT DETECTED

## 2020-12-10 NOTE — TELEPHONE ENCOUNTER
----- Message from Loida Jimenez sent at 12/10/2020 12:31 PM CST -----  Regarding: returnign call  Contact: patient  Type:  Patient Returning Call    Who Called:  pt  Who Left Message for Patient:  rebecca  Does the patient know what this is regarding?:  results  Best Call Back Number:  [phone] (home)     Additional Information:  please call back

## 2020-12-10 NOTE — TELEPHONE ENCOUNTER
Pt called wondering if Covid-19 results were back, I notified pt they are still in process. Pt verbalized understanding.

## 2020-12-11 ENCOUNTER — TELEPHONE (OUTPATIENT)
Dept: FAMILY MEDICINE | Facility: CLINIC | Age: 55
End: 2020-12-11

## 2020-12-11 DIAGNOSIS — N28.89 RENAL MASS: Primary | ICD-10-CM

## 2020-12-11 NOTE — PROGRESS NOTES
Results are unremarkable. Patient notified of results and to continue current treatment plan we discussed in clinic.     Counseling included discussion regarding imaging findings, diagnosis, possibilities, treatment options, medications, risks, and benefits. All questions were answered.      I discussed the importance of close follow up to discuss labs, change or modify medications if needed, monitor side effects, and further evaluation of medical problems.     Jeanmarie Young MD  12/10/2020

## 2020-12-11 NOTE — TELEPHONE ENCOUNTER
----- Message from Al Chandler sent at 12/11/2020 10:59 AM CST -----  Regarding: return call  Contact: self  Type:  Patient Returning Call    Who Called:  self  Who Left Message for Patient:   Does the patient know what this is regarding?:    Best Call Back Number:  [phone]   Additional Information:

## 2020-12-11 NOTE — TELEPHONE ENCOUNTER
Patient saw on [facility] where his covid test was negative. The test was added because the patient had some fatigue.  He would like to know if there is a vitamin or some supplement that you recommend for fatigue now that the covid test is negative.

## 2020-12-16 ENCOUNTER — HOSPITAL ENCOUNTER (OUTPATIENT)
Dept: RADIOLOGY | Facility: HOSPITAL | Age: 55
Discharge: HOME | End: 2020-12-16
Attending: STUDENT IN AN ORGANIZED HEALTH CARE EDUCATION/TRAINING PROGRAM
Payer: MEDICARE

## 2020-12-16 DIAGNOSIS — N28.89 RENAL MASS: ICD-10-CM

## 2020-12-16 PROCEDURE — 76770 US EXAM ABDO BACK WALL COMP: CPT | Mod: TC

## 2020-12-16 PROCEDURE — 76770 US EXAM ABDO BACK WALL COMP: CPT | Mod: 26,,, | Performed by: RADIOLOGY

## 2020-12-16 PROCEDURE — 76770 US RETROPERITONEAL COMPLETE: ICD-10-PCS | Mod: 26,,, | Performed by: RADIOLOGY

## 2020-12-17 NOTE — PROGRESS NOTES
Please call patient to notify that cyst in kidney has not grown and is stable which is good.   Jeanmarie Young MD  12/16/2020

## 2021-05-06 ENCOUNTER — PATIENT MESSAGE (OUTPATIENT)
Dept: RESEARCH | Facility: HOSPITAL | Age: 56
End: 2021-05-06

## 2022-05-31 ENCOUNTER — PATIENT MESSAGE (OUTPATIENT)
Dept: ADMINISTRATIVE | Facility: HOSPITAL | Age: 57
End: 2022-05-31
Payer: MEDICARE

## 2023-11-13 DIAGNOSIS — R06.02 SHORTNESS OF BREATH: Primary | ICD-10-CM

## 2023-12-04 ENCOUNTER — HOSPITAL ENCOUNTER (OUTPATIENT)
Dept: RADIOLOGY | Facility: HOSPITAL | Age: 58
Discharge: HOME | End: 2023-12-04
Attending: INTERNAL MEDICINE
Payer: MEDICARE

## 2023-12-04 DIAGNOSIS — R06.02 SHORTNESS OF BREATH: ICD-10-CM

## 2023-12-04 LAB
CREAT SERPL-MCNC: 1.3 MG/DL (ref 0.5–1.4)
SAMPLE: NORMAL

## 2023-12-04 PROCEDURE — 71275 CT ANGIOGRAPHY CHEST: CPT | Mod: TC,PO

## 2023-12-04 PROCEDURE — 25500020 PHARM REV CODE 255: Mod: PO | Performed by: INTERNAL MEDICINE

## 2023-12-04 RX ADMIN — IOHEXOL 100 ML: 350 INJECTION, SOLUTION INTRAVENOUS at 08:12

## 2024-03-03 ENCOUNTER — HOSPITAL ENCOUNTER (EMERGENCY)
Facility: HOSPITAL | Age: 59
Discharge: HOME | End: 2024-03-03
Attending: STUDENT IN AN ORGANIZED HEALTH CARE EDUCATION/TRAINING PROGRAM
Payer: MEDICARE

## 2024-03-03 VITALS
HEART RATE: 68 BPM | OXYGEN SATURATION: 97 % | SYSTOLIC BLOOD PRESSURE: 124 MMHG | WEIGHT: 160 LBS | BODY MASS INDEX: 25.06 KG/M2 | RESPIRATION RATE: 17 BRPM | DIASTOLIC BLOOD PRESSURE: 77 MMHG | TEMPERATURE: 99 F

## 2024-03-03 DIAGNOSIS — S39.012A STRAIN OF LUMBAR REGION, INITIAL ENCOUNTER: Primary | ICD-10-CM

## 2024-03-03 DIAGNOSIS — M54.9 ACUTE MIDLINE BACK PAIN, UNSPECIFIED BACK LOCATION: ICD-10-CM

## 2024-03-03 DIAGNOSIS — R42 LIGHTHEADEDNESS: ICD-10-CM

## 2024-03-03 LAB
ALBUMIN SERPL BCP-MCNC: 4.6 G/DL (ref 3.5–5.2)
ALP SERPL-CCNC: 62 U/L (ref 55–135)
ALT SERPL W/O P-5'-P-CCNC: 11 U/L (ref 10–44)
ANION GAP SERPL CALC-SCNC: 9 MMOL/L (ref 8–16)
AST SERPL-CCNC: 14 U/L (ref 10–40)
BASOPHILS # BLD AUTO: 0.03 K/UL (ref 0–0.2)
BASOPHILS NFR BLD: 0.3 % (ref 0–1.9)
BILIRUB SERPL-MCNC: 1.2 MG/DL (ref 0.1–1)
BILIRUB UR QL STRIP: NEGATIVE
BNP SERPL-MCNC: 9 PG/ML (ref 0–99)
BUN SERPL-MCNC: 19 MG/DL (ref 6–20)
CALCIUM SERPL-MCNC: 9.8 MG/DL (ref 8.7–10.5)
CHLORIDE SERPL-SCNC: 104 MMOL/L (ref 95–110)
CLARITY UR: CLEAR
CO2 SERPL-SCNC: 25 MMOL/L (ref 23–29)
COLOR UR: YELLOW
CREAT SERPL-MCNC: 1.2 MG/DL (ref 0.5–1.4)
DIFFERENTIAL METHOD BLD: ABNORMAL
EOSINOPHIL # BLD AUTO: 0 K/UL (ref 0–0.5)
EOSINOPHIL NFR BLD: 0.1 % (ref 0–8)
ERYTHROCYTE [DISTWIDTH] IN BLOOD BY AUTOMATED COUNT: 13.1 % (ref 11.5–14.5)
EST. GFR  (NO RACE VARIABLE): >60 ML/MIN/1.73 M^2
GLUCOSE SERPL-MCNC: 89 MG/DL (ref 70–110)
GLUCOSE UR QL STRIP: NEGATIVE
HCT VFR BLD AUTO: 45 % (ref 40–54)
HGB BLD-MCNC: 15.8 G/DL (ref 14–18)
HGB UR QL STRIP: NEGATIVE
IMM GRANULOCYTES # BLD AUTO: 0.02 K/UL (ref 0–0.04)
IMM GRANULOCYTES NFR BLD AUTO: 0.2 % (ref 0–0.5)
KETONES UR QL STRIP: NEGATIVE
LEUKOCYTE ESTERASE UR QL STRIP: NEGATIVE
LYMPHOCYTES # BLD AUTO: 0.8 K/UL (ref 1–4.8)
LYMPHOCYTES NFR BLD: 7.6 % (ref 18–48)
MAGNESIUM SERPL-MCNC: 1.9 MG/DL (ref 1.6–2.6)
MCH RBC QN AUTO: 29.9 PG (ref 27–31)
MCHC RBC AUTO-ENTMCNC: 35.1 G/DL (ref 32–36)
MCV RBC AUTO: 85 FL (ref 82–98)
MONOCYTES # BLD AUTO: 0.5 K/UL (ref 0.3–1)
MONOCYTES NFR BLD: 5.2 % (ref 4–15)
NEUTROPHILS # BLD AUTO: 8.9 K/UL (ref 1.8–7.7)
NEUTROPHILS NFR BLD: 86.6 % (ref 38–73)
NITRITE UR QL STRIP: NEGATIVE
NRBC BLD-RTO: 0 /100 WBC
PH UR STRIP: 8 [PH] (ref 5–8)
PLATELET # BLD AUTO: 247 K/UL (ref 150–450)
PMV BLD AUTO: 10.7 FL (ref 9.2–12.9)
POTASSIUM SERPL-SCNC: 3.9 MMOL/L (ref 3.5–5.1)
PROT SERPL-MCNC: 7.2 G/DL (ref 6–8.4)
PROT UR QL STRIP: ABNORMAL
RBC # BLD AUTO: 5.28 M/UL (ref 4.6–6.2)
SODIUM SERPL-SCNC: 138 MMOL/L (ref 136–145)
SP GR UR STRIP: 1.02 (ref 1–1.03)
TROPONIN I SERPL HS-MCNC: <2.3 PG/ML (ref 0–14.9)
URN SPEC COLLECT METH UR: ABNORMAL
UROBILINOGEN UR STRIP-ACNC: NEGATIVE EU/DL
WBC # BLD AUTO: 10.24 K/UL (ref 3.9–12.7)

## 2024-03-03 PROCEDURE — 25000003 PHARM REV CODE 250

## 2024-03-03 PROCEDURE — 63600175 PHARM REV CODE 636 W HCPCS

## 2024-03-03 PROCEDURE — 85025 COMPLETE CBC W/AUTO DIFF WBC: CPT

## 2024-03-03 PROCEDURE — 81003 URINALYSIS AUTO W/O SCOPE: CPT

## 2024-03-03 PROCEDURE — 83880 ASSAY OF NATRIURETIC PEPTIDE: CPT

## 2024-03-03 PROCEDURE — 96374 THER/PROPH/DIAG INJ IV PUSH: CPT

## 2024-03-03 PROCEDURE — 80053 COMPREHEN METABOLIC PANEL: CPT

## 2024-03-03 PROCEDURE — 84484 ASSAY OF TROPONIN QUANT: CPT

## 2024-03-03 PROCEDURE — 83735 ASSAY OF MAGNESIUM: CPT

## 2024-03-03 PROCEDURE — 96375 TX/PRO/DX INJ NEW DRUG ADDON: CPT

## 2024-03-03 PROCEDURE — 93010 ELECTROCARDIOGRAM REPORT: CPT | Mod: ,,, | Performed by: INTERNAL MEDICINE

## 2024-03-03 PROCEDURE — 99285 EMERGENCY DEPT VISIT HI MDM: CPT | Mod: 25

## 2024-03-03 PROCEDURE — 93005 ELECTROCARDIOGRAM TRACING: CPT | Performed by: INTERNAL MEDICINE

## 2024-03-03 RX ORDER — LIDOCAINE 50 MG/G
1 PATCH TOPICAL
Status: COMPLETED | OUTPATIENT
Start: 2024-03-03 | End: 2024-03-04

## 2024-03-03 RX ORDER — PREDNISONE 20 MG/1
40 TABLET ORAL DAILY
Qty: 8 TABLET | Refills: 0 | Status: SHIPPED | OUTPATIENT
Start: 2024-03-03 | End: 2024-03-07

## 2024-03-03 RX ORDER — HYDROCODONE BITARTRATE AND ACETAMINOPHEN 5; 325 MG/1; MG/1
1 TABLET ORAL
Status: COMPLETED | OUTPATIENT
Start: 2024-03-03 | End: 2024-03-03

## 2024-03-03 RX ORDER — DEXAMETHASONE SODIUM PHOSPHATE 4 MG/ML
8 INJECTION, SOLUTION INTRA-ARTICULAR; INTRALESIONAL; INTRAMUSCULAR; INTRAVENOUS; SOFT TISSUE
Status: DISCONTINUED | OUTPATIENT
Start: 2024-03-03 | End: 2024-03-03

## 2024-03-03 RX ORDER — METHOCARBAMOL 500 MG/1
1000 TABLET, FILM COATED ORAL
Status: COMPLETED | OUTPATIENT
Start: 2024-03-03 | End: 2024-03-03

## 2024-03-03 RX ORDER — ONDANSETRON HYDROCHLORIDE 2 MG/ML
4 INJECTION, SOLUTION INTRAVENOUS
Status: COMPLETED | OUTPATIENT
Start: 2024-03-03 | End: 2024-03-03

## 2024-03-03 RX ORDER — DEXAMETHASONE SODIUM PHOSPHATE 4 MG/ML
8 INJECTION, SOLUTION INTRA-ARTICULAR; INTRALESIONAL; INTRAMUSCULAR; INTRAVENOUS; SOFT TISSUE
Status: COMPLETED | OUTPATIENT
Start: 2024-03-03 | End: 2024-03-03

## 2024-03-03 RX ORDER — LIDOCAINE 50 MG/G
1 PATCH TOPICAL DAILY
Qty: 5 PATCH | Refills: 0 | Status: SHIPPED | OUTPATIENT
Start: 2024-03-03 | End: 2024-03-08

## 2024-03-03 RX ORDER — HYDROCODONE BITARTRATE AND ACETAMINOPHEN 5; 325 MG/1; MG/1
1 TABLET ORAL EVERY 6 HOURS PRN
Qty: 12 TABLET | Refills: 0 | Status: SHIPPED | OUTPATIENT
Start: 2024-03-03 | End: 2024-03-06

## 2024-03-03 RX ORDER — METHOCARBAMOL 500 MG/1
1000 TABLET, FILM COATED ORAL 3 TIMES DAILY
Qty: 30 TABLET | Refills: 0 | Status: SHIPPED | OUTPATIENT
Start: 2024-03-03 | End: 2024-03-11 | Stop reason: SDUPTHER

## 2024-03-03 RX ADMIN — DEXAMETHASONE SODIUM PHOSPHATE 8 MG: 4 INJECTION, SOLUTION INTRA-ARTICULAR; INTRALESIONAL; INTRAMUSCULAR; INTRAVENOUS; SOFT TISSUE at 04:03

## 2024-03-03 RX ADMIN — METHOCARBAMOL TABLETS 1000 MG: 500 TABLET, COATED ORAL at 04:03

## 2024-03-03 RX ADMIN — LIDOCAINE 1 PATCH: 50 PATCH CUTANEOUS at 04:03

## 2024-03-03 RX ADMIN — HYDROCODONE BITARTRATE AND ACETAMINOPHEN 1 TABLET: 5; 325 TABLET ORAL at 04:03

## 2024-03-03 RX ADMIN — ONDANSETRON 4 MG: 2 INJECTION INTRAMUSCULAR; INTRAVENOUS at 04:03

## 2024-03-03 NOTE — Clinical Note
"Srinath"Chago Mckay was seen and treated in our emergency department on 3/3/2024.  He may return to work on 03/08/2024.       If you have any questions or concerns, please don't hesitate to call.      Kristin Lewis NP"

## 2024-03-04 NOTE — ED PROVIDER NOTES
Encounter Date: 3/3/2024       History     Chief Complaint   Patient presents with    Back Pain     Back pain started today after bending over to get something out of the cabinet. He heard a tear and the pain is going down his leg and constant spasms.      Patient is a 59 y.o. male with PMH of migraines who presents to ED via EMS transportation for concern for back pain which began around 1:30 p.m..  Patient reports he was bending down trying to reach something on the bottom shelf when he got lightheaded and went down on his knees.  Patient reports he then got intense pain in his lower back in the center.  Reports the pain seemed to be spasming and would be more intense at times than others.  Patient denies back pain.  Patient denies any direct injury to the back.  Patient denies head injury or loss consciousness.  Patient denies vomiting but reports nausea.  Patient reports he has a history of lightheadedness with his migraines.  Patient denies any current headache or migraine today.  Patient denies any chest pain or shortness breath.  Patient denies fever cough congestion or runny nose.  Patient denies abdominal pain.  Patient denies saddle anesthesia or loss of bowel or bladder.  Patient denies any history of IV drug use. Patient is awake and alert in moderate distress due to pain.      Review of patient's allergies indicates:  No Known Allergies  Past Medical History:   Diagnosis Date    GERD (gastroesophageal reflux disease)     Migraine      Past Surgical History:   Procedure Laterality Date    COLONOSCOPY  ~2014    normal findings per patient report; unsure of provider    FRACTURE SURGERY      knee    UPPER GASTROINTESTINAL ENDOSCOPY  ~2016    Dr. Rocha, normal findings per patient report    UPPER GASTROINTESTINAL ENDOSCOPY  ~2017    Dr. Rocha, normal findings per patient report     Family History   Problem Relation Age of Onset    Hypertension Mother     Colon cancer Father 68    Crohn's disease Neg Hx      Esophageal cancer Neg Hx     Ulcerative colitis Neg Hx     Stomach cancer Neg Hx      Social History     Tobacco Use    Smoking status: Never    Smokeless tobacco: Never   Substance Use Topics    Alcohol use: No    Drug use: No     Review of Systems   Constitutional: Negative.  Negative for fever.   HENT: Negative.     Eyes: Negative.    Respiratory: Negative.  Negative for cough and shortness of breath.    Cardiovascular: Negative.  Negative for chest pain, palpitations and leg swelling.   Gastrointestinal:  Positive for nausea. Negative for abdominal distention, abdominal pain, anal bleeding, blood in stool, diarrhea and vomiting.   Genitourinary: Negative.    Musculoskeletal:  Positive for back pain. Negative for neck pain and neck stiffness.   Skin:  Negative for color change, pallor and rash.   Neurological:  Positive for light-headedness. Negative for dizziness, tremors, seizures, syncope, facial asymmetry, speech difficulty, weakness, numbness and headaches.   Hematological:  Does not bruise/bleed easily.   Psychiatric/Behavioral: Negative.         Physical Exam     Initial Vitals [03/03/24 1455]   BP Pulse Resp Temp SpO2   128/88 69 18 98.8 °F (37.1 °C) 99 %      MAP       --         Physical Exam    Nursing note and vitals reviewed.  Constitutional: He appears well-developed and well-nourished. He is not diaphoretic. No distress.   HENT:   Head: Normocephalic and atraumatic.   Right Ear: External ear normal.   Left Ear: External ear normal.   Eyes: Conjunctivae and EOM are normal. Right eye exhibits no discharge. Left eye exhibits no discharge.   Neck:   Normal range of motion.  Cardiovascular:  Normal rate, regular rhythm, normal heart sounds and intact distal pulses.     Exam reveals no gallop and no friction rub.       No murmur heard.  Pulmonary/Chest: Breath sounds normal. No respiratory distress. He has no wheezes. He has no rhonchi. He has no rales. He exhibits no tenderness.   Abdominal: Abdomen  is soft. Bowel sounds are normal. He exhibits no distension. There is no abdominal tenderness. There is no rebound and no guarding.   Musculoskeletal:      Cervical back: Normal and normal range of motion.      Thoracic back: Normal.      Lumbar back: Spasms and bony tenderness present. No swelling, edema, deformity, signs of trauma or tenderness. Decreased range of motion. Positive right straight leg raise test and positive left straight leg raise test. No scoliosis.        Back:       Right foot: Normal.      Left foot: Normal.     Neurological: He is alert and oriented to person, place, and time. He has normal strength. GCS score is 15. GCS eye subscore is 4. GCS verbal subscore is 5. GCS motor subscore is 6.   Skin: Skin is warm and dry. Capillary refill takes less than 2 seconds.   Psychiatric: He has a normal mood and affect. His behavior is normal. Judgment and thought content normal.         ED Course   Procedures  Labs Reviewed   CBC W/ AUTO DIFFERENTIAL - Abnormal; Notable for the following components:       Result Value    Gran # (ANC) 8.9 (*)     Lymph # 0.8 (*)     Gran % 86.6 (*)     Lymph % 7.6 (*)     All other components within normal limits   COMPREHENSIVE METABOLIC PANEL - Abnormal; Notable for the following components:    Total Bilirubin 1.2 (*)     All other components within normal limits   URINALYSIS, REFLEX TO URINE CULTURE - Abnormal; Notable for the following components:    Protein, UA Trace (*)     All other components within normal limits    Narrative:     Specimen Source->Urine   MAGNESIUM   TROPONIN I HIGH SENSITIVITY   B-TYPE NATRIURETIC PEPTIDE        ECG Results              EKG 12-lead (In process)        Collection Time Result Time QRS Duration OHS QTC Calculation    03/03/24 16:24:29 03/03/24 16:39:06 100 405                     In process by Interface, Lab In [facility] (03/03/24 16:39:17)                   Narrative:    Test Reason : R42,    Vent. Rate : 063 BPM     Atrial Rate :  063 BPM     P-R Int : 156 ms          QRS Dur : 100 ms      QT Int : 396 ms       P-R-T Axes : 041 005 042 degrees     QTc Int : 405 ms    Normal sinus rhythm  Incomplete right bundle branch block  Borderline Abnormal ECG  When compared with ECG of 29-JAN-2008 10:17,  Questionable change in The axis  T wave inversion now evident in Anterior leads    Referred By: [email or web address]   SELF           Confirmed By:                                   Imaging Results              X-Ray Lumbar Spine 5 View (Final result)  Result time 03/03/24 17:39:55      Final result by Kristel Bhat DO (03/03/24 17:39:55)                   Narrative:    LUMBAR SPINE: 3/3/2024 5:39 PM CST    TECHNIQUE:  AP, lateral, right and left lateral oblique, coned down views of the lumbar spine were obtained.    COMPARISON:  None available    HISTORY:  59 years  old Male with low back.    FINDINGS:  Five non-rib bearing vertebrae are seen. The vertebral bodies appear well-aligned.  The vertebral body heights appear to be maintained.  Mild multilevel  intervertebral disc space narrowing is seen. There is no evidence of acute fracture or subluxation. There are degenerative changes seen at the sacroiliac joints.    IMPRESSION:  There is no evidence of an acute fracture or subluxation is seen in the lumbar spine.    Electronically signed by:  Kristel Bhat DO  03/03/2024 05:39 PM CST Workstation: [id number]                                     X-Ray Chest AP Portable (Final result)  Result time 03/03/24 17:37:03      Final result by Delmar Moreno MD (03/03/24 17:37:03)                   Narrative:    History: Near syncope.    FINDINGS: Single AP view of the chest is compared to previous study dated October 28, 2019. No focal infiltrate or pleural effusion is seen. Cardiac silhouette is within normal limits in size. Bony thorax appears intact.    IMPRESSION:  1. No acute cardiopulmonary change seen.    Electronically signed by:  Delmar Moreno MD   03/03/2024 05:37 PM [facility] Workstation: [id number]                                     Medications   LIDOcaine 5 % patch 1 patch (1 patch Transdermal Patch Applied 3/3/24 1638)   methocarbamoL tablet 1,000 mg (1,000 mg Oral Given 3/3/24 1636)   HYDROcodone-acetaminophen 5-325 mg per tablet 1 tablet (1 tablet Oral Given 3/3/24 1636)   ondansetron injection 4 mg (4 mg Intravenous Given 3/3/24 1641)   dexAMETHasone injection 8 mg (8 mg Intravenous Given 3/3/24 1636)     Medical Decision Making  MDM    Patient presents for emergent evaluation of acute back pain that poses a possible threat to life and/or bodily function.    Differential diagnosis included but not limited to fracture, dislocation, sprain, musculoskeletal pain, lumbar strain, cardiac equivalent, MI, electrolyte abnormality, urinary tract infection.  In the ED patient found to have acute clear lung sounds bilaterally with no increased work of breathing.  Patient has a soft nontender abdomen normal bowel sounds in all 4 quadrants.  Patient has pain to palpation over lumbar spine with no pain on palpation to left and right of lumbar spine.  Patient was increasing pain when moving.  Patient was no pain to palpation of thoracic or cervical spine.  Patient was positive straight leg raise test bilaterally.  Patient has normal pulses and sensation distally in bilateral lower extremities.  Patient has normal strength in bilateral lower extremities.  Patient denies saddle anesthesia or changes in bowel or bladder habits.  Patient able to urinate normally while in the ED.  Patient afebrile in the ED. Patient continues to deny chest pain or shortness of breath.  Patient is awake and alert in moderate distress due to pain.      The likely diagnosis musculoskeletal pain with lumbar strain.  X-rays show no signs of acute fracture.  Patient's lab work within normal limits in his signs of urinary tract infection.  Patient's EKG performed and she was normal sinus rhythm with  possible incomplete right bundle branch block at a ventricular rate of 63 beats per minute.  No signs of occlusive MI.  EKG reviewed with my attending.  Patient continuing did not have chest pain or shortness of breath in the ED and cardiac workup negative.  Patient has normal strength in bilateral lower extremities without red flags of back pain.  Low suspicion for cauda equina based off physical exam findings and history.    Discharge MDM  I discussed the patient presentation labs, ekg, X-rays, findings with my attending Dr. Salvador.    Patient was managed in the ED with IV dexamethasone, IV Zofran, oral Norco, oral Robaxin, and topical lidocaine patch.    The response to treatment was decreased pain.    Discussed with the patient have close follow up with Cardiology did EKG findings.  Patient reports he had a recent echo that was normal but reports he will follow up with Cardiology.  Patient was has been orthopedic and Dr. Cason's information to follow up for his back pain.  Patient was discharged in stable condition with close follow up.  Detailed return precautions discussed to return to the ED for fever, saddle anesthesia, change in bowel or bladder, inability to walk, muscle weakness, has been, shortness breath, or any new or worsening concerns.  Patient states understanding.    NP uses Epic and voice recognition software prone to occasional and minor errors that may persist in the medical record.       Amount and/or Complexity of Data Reviewed  Labs: ordered. Decision-making details documented in ED Course.  Radiology: ordered. Decision-making details documented in ED Course.  ECG/medicine tests: ordered and independent interpretation performed. Decision-making details documented in ED Course.    Risk  OTC drugs.  Prescription drug management.               ED Course as of 03/03/24 2318   Sun Mar 03, 2024   1742 BNP: 9 [MP]   1742 Magnesium : 1.9 [MP]   1742 Gran # (ANC)(!): 8.9 [MP]   1742 Lymph #(!):  0.8 [MP]   1742 Gran %(!): 86.6 [MP]   1742 Lymph %(!): 7.6 [MP]   1742 BILIRUBIN TOTAL(!): 1.2 [MP]   1746 X-Ray Lumbar Spine 5 View  IMPRESSION:  There is no evidence of an acute fracture or subluxation is seen in the lumbar spine. [MP]   1746 X-Ray Chest AP Portable  IMPRESSION:  1. No acute cardiopulmonary change seen. [MP]   1900 Protein, UA(!): Trace [MP]   2000 EKG 12-lead  EKG reviewed my attending.  EKG significant for normal sinus rhythm, ventricular rate of 63 bpm.  Possible right bundle-branch block in leads V1 and V2.  No signs of occlusive MI [MP]      ED Course User Index  [MP] Kristin Lewis NP                           Clinical Impression:  Final diagnoses:  [R42] Lightheadedness  [S39.012A] Strain of lumbar region, initial encounter (Primary)  [M54.9] Acute midline back pain, unspecified back location          ED Disposition Condition    Discharge Stable          ED Prescriptions       Medication Sig Dispense Start Date End Date Auth. Provider    predniSONE (DELTASONE) 20 MG tablet Take 2 tablets (40 mg total) by mouth once daily. for 4 days 8 tablet 3/3/2024 3/7/2024 Kristin Lewis NP    LIDOcaine (LIDODERM) 5 % Place 1 patch onto the skin once daily. Remove & Discard patch within 12 hours, do not apply more than 1 patch in a 24 hour period for 5 days 5 patch 3/3/2024 3/8/2024 Kristin Lewis NP    HYDROcodone-acetaminophen (NORCO) 5-325 mg per tablet Take 1 tablet by mouth every 6 (six) hours as needed for Pain. 12 tablet 3/3/2024 3/6/2024 Kristin Lewis NP    methocarbamoL (ROBAXIN) 500 MG Tab Take 2 tablets (1,000 mg total) by mouth 3 (three) times daily. for 5 days 30 tablet 3/3/2024 3/8/2024 Kristin Lewis NP          Follow-up Information       Follow up With Specialties Details Why Contact Info Additional Information    Jeanmarie Young MD Family Medicine Schedule an appointment as soon as possible for a visit  For recheck/continuing care [address]  [place]  [address]  [phone]       Leo Cason MD Physical Medicine and Rehabilitation Schedule an appointment as soon as possible for a visit  For recheck/continuing care [address] [place] [address] [address]  [facility] [address]  [phone]       Nigel Elaine MD Sports Medicine, Orthopedic Surgery Schedule an appointment as soon as possible for a visit  For recheck/continuing care [address] [place] [address] [address]  [facility] [address]  [phone]       cardiology  Schedule an appointment as soon as possible for a visit  For recheck/continuing care      Critical access hospital - Emergency Dept Emergency Medicine  If symptoms worsen [address] [facility] [id number] [phone] 1st floor             Joshua, Kristin RIVAS NP  03/03/24 4850

## 2024-03-11 ENCOUNTER — OFFICE VISIT (OUTPATIENT)
Dept: ORTHOPEDICS | Facility: CLINIC | Age: 59
End: 2024-03-11
Payer: MEDICARE

## 2024-03-11 VITALS — HEIGHT: 67 IN | BODY MASS INDEX: 25.11 KG/M2 | WEIGHT: 160 LBS

## 2024-03-11 DIAGNOSIS — M47.816 LUMBAR FACET ARTHROPATHY: Primary | ICD-10-CM

## 2024-03-11 DIAGNOSIS — M51.36 DISC DEGENERATION, LUMBAR: ICD-10-CM

## 2024-03-11 DIAGNOSIS — M46.06 SPINAL ENTHESOPATHY OF LUMBAR REGION: ICD-10-CM

## 2024-03-11 PROCEDURE — 99203 OFFICE O/P NEW LOW 30 MIN: CPT | Mod: 25,S$GLB,, | Performed by: ORTHOPAEDIC SURGERY

## 2024-03-11 PROCEDURE — 20552 NJX 1/MLT TRIGGER POINT 1/2: CPT | Mod: S$GLB,,, | Performed by: ORTHOPAEDIC SURGERY

## 2024-03-11 RX ORDER — METHOCARBAMOL 500 MG/1
1000 TABLET, FILM COATED ORAL 3 TIMES DAILY PRN
Qty: 30 TABLET | Refills: 0 | Status: SHIPPED | OUTPATIENT
Start: 2024-03-11 | End: 2024-03-16

## 2024-03-11 RX ORDER — PANTOPRAZOLE SODIUM 20 MG/1
40 TABLET, DELAYED RELEASE ORAL DAILY
COMMUNITY

## 2024-03-11 RX ORDER — ESCITALOPRAM OXALATE 20 MG/1
20 TABLET ORAL
COMMUNITY
Start: 2024-02-21 | End: 2024-05-21

## 2024-03-11 RX ORDER — TRIAMCINOLONE ACETONIDE 40 MG/ML
40 INJECTION, SUSPENSION INTRA-ARTICULAR; INTRAMUSCULAR
Status: DISCONTINUED | OUTPATIENT
Start: 2024-03-11 | End: 2024-03-11 | Stop reason: HOSPADM

## 2024-03-11 RX ORDER — IBUPROFEN 800 MG/1
800 TABLET ORAL 3 TIMES DAILY PRN
Qty: 60 TABLET | Refills: 0 | Status: SHIPPED | OUTPATIENT
Start: 2024-03-11

## 2024-03-11 RX ORDER — IBUPROFEN 800 MG/1
800 TABLET ORAL 3 TIMES DAILY PRN
Qty: 60 TABLET | Refills: 0 | OUTPATIENT
Start: 2024-03-11 | End: 2024-03-11

## 2024-03-11 RX ORDER — METHOCARBAMOL 500 MG/1
1000 TABLET, FILM COATED ORAL 3 TIMES DAILY PRN
Qty: 30 TABLET | Refills: 0 | OUTPATIENT
Start: 2024-03-11 | End: 2024-03-11

## 2024-03-11 RX ORDER — SCOLOPAMINE TRANSDERMAL SYSTEM 1 MG/1
1 PATCH, EXTENDED RELEASE TRANSDERMAL
COMMUNITY
Start: 2024-01-24 | End: 2024-05-29

## 2024-03-11 RX ORDER — HYDROCODONE BITARTRATE AND ACETAMINOPHEN 10; 325 MG/1; MG/1
1 TABLET ORAL EVERY 6 HOURS PRN
COMMUNITY

## 2024-03-11 RX ADMIN — TRIAMCINOLONE ACETONIDE 40 MG: 40 INJECTION, SUSPENSION INTRA-ARTICULAR; INTRAMUSCULAR at 08:03

## 2024-03-11 NOTE — PROCEDURES
Trigger Point Injection    Performed by: Kranthi Norton MD  Authorized by: Kranthi Norton MD    Lumbar Paraspinal:  Right    Consent Done?:  Yes (Verbal)    Pre-Procedure:   Indications:  Pain  Site marked: the procedure site was marked     Timeout: prior to procedure the correct patient, procedure, and site was verified    Prep: patient was prepped and draped in usual sterile fashion     Local anesthesia used?: Yes    Local anesthetic:  Lidocaine 1% without epinephrine  Medications: 40 mg triamcinolone acetonide 40 mg/mL

## 2024-03-11 NOTE — PROGRESS NOTES
Subjective:       Patient ID: Srinath Mckay Jr. is a 59 y.o. male.    Chief Complaint: Pain of the Spine (Midline back pain with pain radiating down legs one week after bending/stooping to get pan from lower cabinet felt pop in back followed by spasm and pain radiating down back into legs left was worse than right. Pain in leg has lessened but constant buirning with pressure is still present when to ER 3.3.24)      History of Present Illness    Prior to meeting with the patient I reviewed the medical chart in [facility]. This included reviewing the previous progress notes from our office, review of the patient's last appointment with their primary care provider, review of any visits to the emergency room, and review of any pain management appointments or procedures.   Patient is here for initial evaluation chief complaint of mid to upper lumbar midline pain that started about a week ago after he was bending in the closet to get something.  Pain became severe within 2 hours of the inciting event causing the patient to go to the emergency room.  Patient denies any previous incident like this.  At the onset of the severe symptoms he also had radicular pain both lower extremities but that has since resolved.    Current Medications  Current Outpatient Medications   Medication Sig Dispense Refill    EScitalopram oxalate (LEXAPRO) 20 MG tablet Take 20 mg by mouth.      scopolamine (TRANSDERM-SCOP) 1.3-1.5 mg (1 mg over 3 days) Place 1 patch onto the skin.      HYDROcodone-acetaminophen (NORCO)  mg per tablet Take 1 tablet by mouth every 6 (six) hours as needed.      multivitamin (THERAGRAN) per tablet Take 1 tablet by mouth once daily.      ondansetron (ZOFRAN) 8 MG tablet Take 8 mg by mouth every 6 (six) hours.  12    pantoprazole (PROTONIX) 20 MG tablet Take 40 mg by mouth.      rizatriptan (MAXALT-MLT) 10 MG disintegrating tablet rizatriptan 10 mg disintegrating tablet   TAKE 1 TAB BY MOUTH AT ONSET OF HEADACHE  MAY TAKE 2ND TAB IN 2 HOURS IF NEEDED MAX OF 2 TABS PER DAY      verapamil (CALAN-SR) 120 MG CR tablet Take 120 mg by mouth every evening.  2     No current facility-administered medications for this visit.       Allergies  Review of patient's allergies indicates:  No Known Allergies    Past Medical History  Past Medical History:   Diagnosis Date    GERD (gastroesophageal reflux disease)     Migraine        Surgical History  Past Surgical History:   Procedure Laterality Date    COLONOSCOPY  ~2014    normal findings per patient report; unsure of provider    FRACTURE SURGERY      knee    UPPER GASTROINTESTINAL ENDOSCOPY  ~2016    Dr. Rocha, normal findings per patient report    UPPER GASTROINTESTINAL ENDOSCOPY  ~2017    Dr. Rocha, normal findings per patient report       Family History:   Family History   Problem Relation Age of Onset    Hypertension Mother     Colon cancer Father 68    Crohn's disease Neg Hx     Esophageal cancer Neg Hx     Ulcerative colitis Neg Hx     Stomach cancer Neg Hx        Social History:   Social History     Socioeconomic History    Marital status:    Occupational History    Occupation: disabled    Tobacco Use    Smoking status: Never    Smokeless tobacco: Never   Substance and Sexual Activity    Alcohol use: No    Drug use: No    Sexual activity: Yes     Partners: Female     Social Determinants of Health     Stress: No Stress Concern Present (12/7/2020)    [facility] [employer] - Occupational Stress Questionnaire     Feeling of Stress : Only a little       Hospitalization/Major Diagnostic Procedure:     Review of Systems     General/Constitutional:  Chills denies. Fatigue denies. Fever denies. Weight gain denies. Weight loss denies.    Respiratory:  Shortness of breath denies.    Cardiovascular:  Chest pain denies.    Gastrointestinal:  Constipation denies. Diarrhea denies. Nausea denies. Vomiting denies.     Hematology:  Easy bruising denies. Prolonged  bleeding denies.     Genitourinary:  Frequent urination denies. Pain in lower back denies. Painful urination denies.     Musculoskeletal:  See HPI for details    Skin:  Rash denies.    Neurologic:  Dizziness denies. Gait abnormalities denies. Seizures denies. Tingling/Numbess denies.    Psychiatric:  Anxiety denies. Depressed mood denies.     Objective:   Vital Signs: There were no vitals filed for this visit.     Physical Exam      General Examination:     Constitutional: The patient is alert and oriented to lace person and time. Mood is pleasant.     Head/Face: Normal facial features normal eyebrows    Eyes: Normal extraocular motion bilaterally    Lungs: Respirations are equal and unlabored    Gait is coordinated.    Cardiovascular: There are no swelling or varicosities present.    Lymphatic: Negative for adenopathy    Skin: Normal    Neurological: Level of consciousness normal. Oriented to place person and time and situation    Psychiatric: Oriented to time place person and situation    Lumbar exam demonstrates an antalgic and guarded gait as well as an antalgic sit to stand.  Thoracolumbar tenderness palpation is noted.  Lumbar flexion is limited to about 60% of normal secondary to pain and guarding.  Extension within normal limits.  Bilateral lower extremities demonstrate full active range of motion, equal symmetric DTRs slightly hyperactive at 3+, normal strength and negative straight leg raise maneuver.  However, straight leg raise does cause back pain.    XRAY Report/ Interpretation :  Lumbar AP and lateral x-rays taken in the office today and reviewed the patient demonstrates decreased lordosis in general.  He definitely has some degenerative disc issues at L4-5 and L5-S1 with some corresponding facet sclerosis at both levels and some mild-to-moderate facet arthropathy at L2-3 and L3-4.      Assessment:       1. Disc degeneration, lumbar    2. Lumbar facet arthropathy        Plan:       Srinath was seen  today for pain.    Diagnoses and all orders for this visit:    Disc degeneration, lumbar    Lumbar facet arthropathy         No follow-ups on file.  This is to attest that the physician's assistant, Leo Huang served in the capacity as a scribe for this patient's encounter.  This is also verify that I have reviewed the patient's history and help formulate the treatment plan as discussed with the physician's assistant.  I have actively participated in the evaluation and treatment plan for this patient visit.  The treatment plan and medical decision-making is outlined below  Patient was given a midline mid to upper lumbar trigger point injection with 40 mg of Kenalog and 1 cc lidocaine.  He will also be referred to physical therapy 2 to 3 times a week for 4-6 weeks.  Medications prescribed include ibuprofen 800 mg 3 times a day as well as continuing with the Robaxin that was prescribed at the emergency room 500 mg up to 4 times a day as needed for muscle spasm.  We would like see him back in 2 months or sooner if needed.    Treatment options were discussed with regards to the nature of the medical condition. Conservative pain intervention and surgical options were discussed in detail. The probability of success of each separate treatment option was discussed. The patient expressed a clear understanding of the treatment options. With regards to surgery, the procedure risk, benefits, complications, and outcomes were discussed. No guarantees were given with regards to surgical outcome.   The risk of complications, morbidity, and mortality of patient management decisions have been made at the time of this visit. These are associated with the patient's problems, diagnostic procedures and treatment options. This includes the possible management options selected and those considered but not selected by the patient after shared medical decision making we discussed with the patient.   This note was created using Dragon  voice recognition software that occasionally misinterpreted phrases or words.

## 2024-03-25 LAB
OHS QRS DURATION: 100 MS
OHS QTC CALCULATION: 405 MS

## 2024-04-29 ENCOUNTER — LAB VISIT (OUTPATIENT)
Dept: LAB | Facility: HOSPITAL | Age: 59
End: 2024-04-29
Attending: SPECIALIST
Payer: MEDICARE

## 2024-04-29 DIAGNOSIS — N28.1 ACQUIRED CYST OF KIDNEY: ICD-10-CM

## 2024-04-29 DIAGNOSIS — N13.8 ENLARGED PROSTATE WITH URINARY OBSTRUCTION: ICD-10-CM

## 2024-04-29 DIAGNOSIS — N13.8 ENLARGED PROSTATE WITH URINARY OBSTRUCTION: Primary | ICD-10-CM

## 2024-04-29 DIAGNOSIS — Z12.5 SPECIAL SCREENING FOR MALIGNANT NEOPLASM OF PROSTATE: ICD-10-CM

## 2024-04-29 DIAGNOSIS — N40.1 ENLARGED PROSTATE WITH URINARY OBSTRUCTION: ICD-10-CM

## 2024-04-29 DIAGNOSIS — N40.1 ENLARGED PROSTATE WITH URINARY OBSTRUCTION: Primary | ICD-10-CM

## 2024-04-29 LAB
ANION GAP SERPL CALC-SCNC: 7 MMOL/L (ref 8–16)
BUN SERPL-MCNC: 17 MG/DL (ref 6–20)
CALCIUM SERPL-MCNC: 9.5 MG/DL (ref 8.7–10.5)
CHLORIDE SERPL-SCNC: 104 MMOL/L (ref 95–110)
CO2 SERPL-SCNC: 29 MMOL/L (ref 23–29)
COMPLEXED PSA SERPL-MCNC: 0.37 NG/ML (ref 0–4)
CREAT SERPL-MCNC: 1.3 MG/DL (ref 0.5–1.4)
EST. GFR  (NO RACE VARIABLE): >60 ML/MIN/1.73 M^2
GLUCOSE SERPL-MCNC: 111 MG/DL (ref 70–110)
POTASSIUM SERPL-SCNC: 4.3 MMOL/L (ref 3.5–5.1)
SODIUM SERPL-SCNC: 140 MMOL/L (ref 136–145)

## 2024-04-29 PROCEDURE — 36415 COLL VENOUS BLD VENIPUNCTURE: CPT | Performed by: SPECIALIST

## 2024-04-29 PROCEDURE — 80048 BASIC METABOLIC PNL TOTAL CA: CPT | Performed by: SPECIALIST

## 2024-04-29 PROCEDURE — 84153 ASSAY OF PSA TOTAL: CPT | Performed by: SPECIALIST

## 2024-05-29 ENCOUNTER — TELEPHONE (OUTPATIENT)
Dept: RADIOLOGY | Facility: HOSPITAL | Age: 59
End: 2024-05-29

## 2024-05-29 DIAGNOSIS — R07.89 OTHER CHEST PAIN: Primary | ICD-10-CM

## 2024-05-29 NOTE — NURSING
Cta cardiac scheduled @ [facility] main on 6/7 @ 8am with arrival @ 715.  Pre-testing instructions given and understanding verbalized.

## 2024-06-07 ENCOUNTER — HOSPITAL ENCOUNTER (OUTPATIENT)
Dept: RADIOLOGY | Facility: HOSPITAL | Age: 59
Discharge: HOME | End: 2024-06-07
Attending: INTERNAL MEDICINE
Payer: MEDICARE

## 2024-06-07 VITALS
SYSTOLIC BLOOD PRESSURE: 121 MMHG | DIASTOLIC BLOOD PRESSURE: 76 MMHG | RESPIRATION RATE: 17 BRPM | OXYGEN SATURATION: 99 % | HEART RATE: 54 BPM

## 2024-06-07 DIAGNOSIS — R07.89 OTHER CHEST PAIN: ICD-10-CM

## 2024-06-07 PROCEDURE — 75574 CT ANGIO HRT W/3D IMAGE: CPT | Mod: TC

## 2024-06-07 PROCEDURE — 25000003 PHARM REV CODE 250: Performed by: INTERNAL MEDICINE

## 2024-06-07 PROCEDURE — 75574 CT ANGIO HRT W/3D IMAGE: CPT | Mod: 26,,, | Performed by: RADIOLOGY

## 2024-06-07 PROCEDURE — 25500020 PHARM REV CODE 255: Performed by: INTERNAL MEDICINE

## 2024-06-07 RX ORDER — NITROGLYCERIN 400 UG/1
1 SPRAY ORAL EVERY 5 MIN PRN
Status: DISCONTINUED | OUTPATIENT
Start: 2024-06-07 | End: 2024-06-08 | Stop reason: HOSPADM

## 2024-06-07 RX ORDER — METOPROLOL TARTRATE 50 MG/1
50 TABLET ORAL ONCE
Status: COMPLETED | OUTPATIENT
Start: 2024-06-07 | End: 2024-06-07

## 2024-06-07 RX ADMIN — METOPROLOL TARTRATE 50 MG: 50 TABLET ORAL at 07:06

## 2024-06-07 RX ADMIN — NITROGLYCERIN 1 SPRAY: 400 SPRAY ORAL at 08:06

## 2024-06-07 RX ADMIN — IOHEXOL 90 ML: 350 INJECTION, SOLUTION INTRAVENOUS at 10:06

## 2024-06-07 NOTE — PLAN OF CARE
Tolerated well.  VSS. Denies pain or nausea.  Instructed to drink at least 64 ounces of fluid today to flush kidneys of contrast.  Understanding verbalized.  Discharged to home via private vehicle

## 2024-06-07 NOTE — PLAN OF CARE
Ambulated to imaging.  Id'd x 2 pt identifiers.  AAO x 3. GASTON x4. Resp REU.   Denies pain or nausea. All questions answered.

## 2025-06-05 DIAGNOSIS — N28.1 ACQUIRED CYST OF KIDNEY: Primary | ICD-10-CM

## 2025-06-26 ENCOUNTER — HOSPITAL ENCOUNTER (OUTPATIENT)
Dept: RADIOLOGY | Facility: HOSPITAL | Age: 60
Discharge: HOME | End: 2025-06-26
Attending: SPECIALIST
Payer: MEDICARE

## 2025-06-26 DIAGNOSIS — N28.1 ACQUIRED CYST OF KIDNEY: ICD-10-CM

## 2025-06-26 PROCEDURE — 76770 US EXAM ABDO BACK WALL COMP: CPT | Mod: 26,,, | Performed by: RADIOLOGY

## 2025-06-26 PROCEDURE — 76770 US EXAM ABDO BACK WALL COMP: CPT | Mod: TC,PO

## 2025-07-01 ENCOUNTER — LAB VISIT (OUTPATIENT)
Dept: LAB | Facility: HOSPITAL | Age: 60
End: 2025-07-01
Attending: SPECIALIST
Payer: MEDICARE

## 2025-07-01 DIAGNOSIS — Z12.5 SPECIAL SCREENING FOR MALIGNANT NEOPLASM OF PROSTATE: Primary | ICD-10-CM

## 2025-07-01 LAB — PSA SERPL-MCNC: 0.39 NG/ML (ref ?–4)

## 2025-07-01 PROCEDURE — 84153 ASSAY OF PSA TOTAL: CPT

## 2025-07-01 PROCEDURE — 36415 COLL VENOUS BLD VENIPUNCTURE: CPT | Mod: PO
